# Patient Record
Sex: FEMALE | Race: WHITE | NOT HISPANIC OR LATINO | Employment: FULL TIME | URBAN - METROPOLITAN AREA
[De-identification: names, ages, dates, MRNs, and addresses within clinical notes are randomized per-mention and may not be internally consistent; named-entity substitution may affect disease eponyms.]

---

## 2017-08-17 LAB
ALBUMIN SERPL-MCNC: 4.5 G/DL (ref 3.6–5.1)
ALBUMIN/GLOB SERPL: 1.7 (CALC) (ref 1–2.5)
ALP SERPL-CCNC: 40 U/L (ref 33–130)
ALT SERPL-CCNC: 11 U/L (ref 6–29)
AST SERPL-CCNC: 16 U/L (ref 10–35)
BASOPHILS # BLD AUTO: 20 CELLS/UL (ref 0–200)
BASOPHILS NFR BLD AUTO: 0.6 %
BILIRUB SERPL-MCNC: 0.4 MG/DL (ref 0.2–1.2)
BUN SERPL-MCNC: 19 MG/DL (ref 7–25)
BUN/CREAT SERPL: NORMAL (CALC) (ref 6–22)
CALCIUM SERPL-MCNC: 9.6 MG/DL (ref 8.6–10.4)
CHLORIDE SERPL-SCNC: 103 MMOL/L (ref 98–110)
CHOLEST SERPL-MCNC: 167 MG/DL (ref 125–200)
CHOLEST/HDLC SERPL: 1.7 (CALC)
CO2 SERPL-SCNC: 29 MMOL/L (ref 20–31)
CREAT SERPL-MCNC: 0.84 MG/DL (ref 0.5–1.05)
EOSINOPHIL # BLD AUTO: 50 CELLS/UL (ref 15–500)
EOSINOPHIL NFR BLD AUTO: 1.5 %
ERYTHROCYTE [DISTWIDTH] IN BLOOD BY AUTOMATED COUNT: 13.2 % (ref 11–15)
GLOBULIN SER CALC-MCNC: 2.6 G/DL (CALC) (ref 1.9–3.7)
GLUCOSE SERPL-MCNC: 75 MG/DL (ref 65–99)
HCT VFR BLD AUTO: 38.4 % (ref 35–45)
HDLC SERPL-MCNC: 96 MG/DL
HGB BLD-MCNC: 12.3 G/DL (ref 11.7–15.5)
LDLC SERPL CALC-MCNC: 61 MG/DL (CALC)
LYMPHOCYTES # BLD AUTO: 1026 CELLS/UL (ref 850–3900)
LYMPHOCYTES NFR BLD AUTO: 31.1 %
MCH RBC QN AUTO: 28.5 PG (ref 27–33)
MCHC RBC AUTO-ENTMCNC: 32 G/DL (ref 32–36)
MCV RBC AUTO: 89.1 FL (ref 80–100)
MONOCYTES # BLD AUTO: 314 CELLS/UL (ref 200–950)
MONOCYTES NFR BLD AUTO: 9.5 %
NEUTROPHILS # BLD AUTO: 1891 CELLS/UL (ref 1500–7800)
NEUTROPHILS NFR BLD AUTO: 57.3 %
NONHDLC SERPL-MCNC: 71 MG/DL (CALC)
PLATELET # BLD AUTO: 166 THOUSAND/UL (ref 140–400)
PMV BLD REES-ECKER: 11.7 FL (ref 7.5–12.5)
POTASSIUM SERPL-SCNC: 4.3 MMOL/L (ref 3.5–5.3)
PROT SERPL-MCNC: 7.1 G/DL (ref 6.1–8.1)
RBC # BLD AUTO: 4.31 MILLION/UL (ref 3.8–5.1)
SL AMB EGFR AFRICAN AMERICAN: 92 ML/MIN/1.73M2
SL AMB EGFR NON AFRICAN AMERICAN: 79 ML/MIN/1.73M2
SODIUM SERPL-SCNC: 138 MMOL/L (ref 135–146)
TRIGL SERPL-MCNC: 49 MG/DL
TSH SERPL-ACNC: 3.75 MIU/L
WBC # BLD AUTO: 3.3 THOUSAND/UL (ref 3.8–10.8)

## 2017-08-23 LAB
BASOPHILS # BLD AUTO: 20 CELLS/UL (ref 0–200)
BASOPHILS NFR BLD AUTO: 0.7 %
EOSINOPHIL # BLD AUTO: 41 CELLS/UL (ref 15–500)
EOSINOPHIL NFR BLD AUTO: 1.4 %
ERYTHROCYTE [DISTWIDTH] IN BLOOD BY AUTOMATED COUNT: 13.1 % (ref 11–15)
HCT VFR BLD AUTO: 34.9 % (ref 35–45)
HGB BLD-MCNC: 11.1 G/DL (ref 11.7–15.5)
LYMPHOCYTES # BLD AUTO: 1293 CELLS/UL (ref 850–3900)
LYMPHOCYTES NFR BLD AUTO: 44.6 %
MCH RBC QN AUTO: 28.6 PG (ref 27–33)
MCHC RBC AUTO-ENTMCNC: 31.8 G/DL (ref 32–36)
MCV RBC AUTO: 89.9 FL (ref 80–100)
MONOCYTES # BLD AUTO: 264 CELLS/UL (ref 200–950)
MONOCYTES NFR BLD AUTO: 9.1 %
NEUTROPHILS # BLD AUTO: 1282 CELLS/UL (ref 1500–7800)
NEUTROPHILS NFR BLD AUTO: 44.2 %
PLATELET # BLD AUTO: 155 THOUSAND/UL (ref 140–400)
PMV BLD REES-ECKER: 11.7 FL (ref 7.5–12.5)
RBC # BLD AUTO: 3.88 MILLION/UL (ref 3.8–5.1)
WBC # BLD AUTO: 2.9 THOUSAND/UL (ref 3.8–10.8)

## 2017-09-05 LAB — HCV AB SER-ACNC: NEGATIVE

## 2018-08-15 RX ORDER — FLUTICASONE PROPIONATE 50 MCG
SPRAY, SUSPENSION (ML) NASAL
COMMUNITY
Start: 2017-12-01 | End: 2018-12-06 | Stop reason: SDUPTHER

## 2018-08-15 RX ORDER — ELECTROLYTES/DEXTROSE
SOLUTION, ORAL ORAL EVERY 24 HOURS
COMMUNITY
Start: 2018-05-08

## 2018-08-16 DIAGNOSIS — Z13.220 SCREENING FOR LIPOID DISORDERS: ICD-10-CM

## 2018-08-16 DIAGNOSIS — D72.819 LEUKOPENIA, UNSPECIFIED TYPE: Primary | ICD-10-CM

## 2018-08-16 DIAGNOSIS — Z13.6 SCREENING FOR CARDIOVASCULAR CONDITION: ICD-10-CM

## 2018-08-16 DIAGNOSIS — D52.9 ANEMIA DUE TO FOLIC ACID DEFICIENCY, UNSPECIFIED DEFICIENCY TYPE: ICD-10-CM

## 2018-08-21 ENCOUNTER — APPOINTMENT (OUTPATIENT)
Dept: LAB | Facility: CLINIC | Age: 53
End: 2018-08-21
Payer: COMMERCIAL

## 2018-08-21 DIAGNOSIS — Z13.6 SCREENING FOR CARDIOVASCULAR CONDITION: ICD-10-CM

## 2018-08-21 DIAGNOSIS — D52.9 ANEMIA DUE TO FOLIC ACID DEFICIENCY, UNSPECIFIED DEFICIENCY TYPE: ICD-10-CM

## 2018-08-21 DIAGNOSIS — Z13.220 SCREENING FOR LIPOID DISORDERS: ICD-10-CM

## 2018-08-21 DIAGNOSIS — D72.819 LEUKOPENIA, UNSPECIFIED TYPE: ICD-10-CM

## 2018-08-21 LAB
ALBUMIN SERPL BCP-MCNC: 3.6 G/DL (ref 3.5–5)
ALP SERPL-CCNC: 51 U/L (ref 46–116)
ALT SERPL W P-5'-P-CCNC: 17 U/L (ref 12–78)
ANION GAP SERPL CALCULATED.3IONS-SCNC: 2 MMOL/L (ref 4–13)
AST SERPL W P-5'-P-CCNC: 16 U/L (ref 5–45)
BASOPHILS # BLD AUTO: 0.02 THOUSANDS/ΜL (ref 0–0.1)
BASOPHILS NFR BLD AUTO: 1 % (ref 0–1)
BILIRUB SERPL-MCNC: 0.42 MG/DL (ref 0.2–1)
BUN SERPL-MCNC: 22 MG/DL (ref 5–25)
CALCIUM SERPL-MCNC: 8.9 MG/DL (ref 8.3–10.1)
CHLORIDE SERPL-SCNC: 105 MMOL/L (ref 100–108)
CHOLEST SERPL-MCNC: 151 MG/DL (ref 50–200)
CO2 SERPL-SCNC: 28 MMOL/L (ref 21–32)
CREAT SERPL-MCNC: 0.74 MG/DL (ref 0.6–1.3)
EOSINOPHIL # BLD AUTO: 0.06 THOUSAND/ΜL (ref 0–0.61)
EOSINOPHIL NFR BLD AUTO: 2 % (ref 0–6)
ERYTHROCYTE [DISTWIDTH] IN BLOOD BY AUTOMATED COUNT: 13.2 % (ref 11.6–15.1)
GFR SERPL CREATININE-BSD FRML MDRD: 92 ML/MIN/1.73SQ M
GLUCOSE P FAST SERPL-MCNC: 80 MG/DL (ref 65–99)
HCT VFR BLD AUTO: 37.2 % (ref 34.8–46.1)
HDLC SERPL-MCNC: 88 MG/DL (ref 40–60)
HGB BLD-MCNC: 12.1 G/DL (ref 11.5–15.4)
IMM GRANULOCYTES # BLD AUTO: 0 THOUSAND/UL (ref 0–0.2)
IMM GRANULOCYTES NFR BLD AUTO: 0 % (ref 0–2)
LDLC SERPL CALC-MCNC: 59 MG/DL (ref 0–100)
LYMPHOCYTES # BLD AUTO: 1.48 THOUSANDS/ΜL (ref 0.6–4.47)
LYMPHOCYTES NFR BLD AUTO: 43 % (ref 14–44)
MCH RBC QN AUTO: 29.2 PG (ref 26.8–34.3)
MCHC RBC AUTO-ENTMCNC: 32.5 G/DL (ref 31.4–37.4)
MCV RBC AUTO: 90 FL (ref 82–98)
MONOCYTES # BLD AUTO: 0.3 THOUSAND/ΜL (ref 0.17–1.22)
MONOCYTES NFR BLD AUTO: 9 % (ref 4–12)
NEUTROPHILS # BLD AUTO: 1.6 THOUSANDS/ΜL (ref 1.85–7.62)
NEUTS SEG NFR BLD AUTO: 45 % (ref 43–75)
NONHDLC SERPL-MCNC: 63 MG/DL
NRBC BLD AUTO-RTO: 0 /100 WBCS
PLATELET # BLD AUTO: 155 THOUSANDS/UL (ref 149–390)
PMV BLD AUTO: 11.6 FL (ref 8.9–12.7)
POTASSIUM SERPL-SCNC: 4 MMOL/L (ref 3.5–5.3)
PROT SERPL-MCNC: 7.2 G/DL (ref 6.4–8.2)
RBC # BLD AUTO: 4.15 MILLION/UL (ref 3.81–5.12)
SODIUM SERPL-SCNC: 135 MMOL/L (ref 136–145)
TRIGL SERPL-MCNC: 21 MG/DL
TSH SERPL DL<=0.05 MIU/L-ACNC: 2.35 UIU/ML
WBC # BLD AUTO: 3.46 THOUSAND/UL (ref 4.31–10.16)

## 2018-08-21 PROCEDURE — 85025 COMPLETE CBC W/AUTO DIFF WBC: CPT

## 2018-08-21 PROCEDURE — 80053 COMPREHEN METABOLIC PANEL: CPT

## 2018-08-21 PROCEDURE — 36415 COLL VENOUS BLD VENIPUNCTURE: CPT

## 2018-08-21 PROCEDURE — 84443 ASSAY THYROID STIM HORMONE: CPT

## 2018-08-21 PROCEDURE — 80061 LIPID PANEL: CPT

## 2018-09-21 PROBLEM — N95.1 HOT FLASH, MENOPAUSAL: Status: ACTIVE | Noted: 2017-03-02

## 2018-09-24 ENCOUNTER — OFFICE VISIT (OUTPATIENT)
Dept: FAMILY MEDICINE CLINIC | Facility: CLINIC | Age: 53
End: 2018-09-24
Payer: COMMERCIAL

## 2018-09-24 VITALS
TEMPERATURE: 96.8 F | HEIGHT: 64 IN | BODY MASS INDEX: 18.27 KG/M2 | HEART RATE: 80 BPM | DIASTOLIC BLOOD PRESSURE: 82 MMHG | SYSTOLIC BLOOD PRESSURE: 116 MMHG | RESPIRATION RATE: 16 BRPM | OXYGEN SATURATION: 94 % | WEIGHT: 107 LBS

## 2018-09-24 DIAGNOSIS — Z23 NEED FOR INFLUENZA VACCINATION: ICD-10-CM

## 2018-09-24 DIAGNOSIS — D72.818 OTHER DECREASED WHITE BLOOD CELL (WBC) COUNT: ICD-10-CM

## 2018-09-24 DIAGNOSIS — Z00.00 GENERAL MEDICAL EXAM: Primary | ICD-10-CM

## 2018-09-24 PROBLEM — D72.819 LEUKOPENIA: Status: ACTIVE | Noted: 2018-09-24

## 2018-09-24 PROCEDURE — 90682 RIV4 VACC RECOMBINANT DNA IM: CPT

## 2018-09-24 PROCEDURE — 99396 PREV VISIT EST AGE 40-64: CPT | Performed by: FAMILY MEDICINE

## 2018-09-24 PROCEDURE — 90471 IMMUNIZATION ADMIN: CPT

## 2018-09-24 RX ORDER — CYCLOSPORINE 0.5 MG/ML
EMULSION OPHTHALMIC
COMMUNITY
Start: 2018-07-11

## 2018-09-24 RX ORDER — METRONIDAZOLE 7.5 MG/G
GEL VAGINAL
COMMUNITY
Start: 2018-07-19 | End: 2021-11-03

## 2018-09-24 RX ORDER — BIOTIN 10 MG
TABLET ORAL
COMMUNITY

## 2018-09-24 RX ORDER — NICOTINE 14MG/24HR
PATCH, TRANSDERMAL 24 HOURS TRANSDERMAL
COMMUNITY
Start: 2014-01-23

## 2018-09-24 RX ORDER — VITAMIN B COMPLEX
1 TABLET ORAL
COMMUNITY

## 2018-09-24 NOTE — PATIENT INSTRUCTIONS

## 2018-09-24 NOTE — ASSESSMENT & PLAN NOTE
Asymptomatic afebrile patient already seen by Hematology workup was negative we will watch it every 6 months with CBC with diff the patient was instructed InCase of fever or infection to call the office right away

## 2018-09-24 NOTE — PROGRESS NOTES
[facility] HEALTH MAINTENANCE OFFICE VISIT  [facility] [place] [facility] [place]    NAME: Chery Benitez  AGE: 46 y o  SEX: female  : 1965     DATE: 2018    Assessment and Plan     Problem List Items Addressed This Visit     Leukopenia     Asymptomatic afebrile patient already seen by Hematology workup was negative we will watch it every 6 months with CBC with diff the patient was instructed InCase of fever or infection to call the office right away         Relevant Orders    CBC and differential      Other Visit Diagnoses     General medical exam    -  Primary    Discussed the increased activity healthy diet will hydration sunscreen patient will have the flu shot today    Need for influenza vaccination        Relevant Orders    influenza vaccine, [id number], quadrivalent, recombinant, PF, 0 5 mL, for patients 18 yr+ (FLUBLOK) (Completed)            · Patient Counseling:   · Nutrition: Stressed importance of a well balanced diet, moderation of sodium/saturated fat, caloric balance and sufficient intake of fiber  · Exercise: Stressed the importance of regular exercise with a goal of 150 minutes per week  · Dental Health: Discussed daily flossing and brushing and regular dental visits     · Immunizations reviewed Patient is due for flu shot  · Discussed benefits of screening  Patient is up-to-date with Pap smear and she is up-to-date with mammogram  · Discussed the patient's BMI with her  The BMI is in the acceptable range     Return in about 6 months (around 3/24/2019)          Chief Complaint     Chief Complaint   Patient presents with    Physical Exam     Yearly Physical Exam        History of Present Illness     Patient here for her annual physical exam patient deny any chest pain short of breath no palpitation no headache no blurred vision no weakness or lateralized of the symptom no abdomen pain no nausea vomiting or diarrhea no renal problem no rash no fever no change in the weight   Patient does do exercise regularly 5 times a week she does do healthy diet she is up-to-date with her Pap smear and a mammogram as she did have her colonoscopy at age 47  Recent blood work discussed with the patient show her white blood cell and low leukopenia patient but better from before        Well Adult Physical   Patient here for a comprehensive physical exam       Diet and Physical Activity  Diet: well balanced diet  Weight concerns: None, patient's BMI is between 18 5-24 9  Exercise: frequently      Depression Screen  PHQ-9 Depression Screening    PHQ-9:    Frequency of the following problems over the past two weeks:       Little interest or pleasure in doing things:  0 - not at all  Feeling down, depressed, or hopeless:  0 - not at all  PHQ-2 Score:  0          General Health  Hearing: Normal:  bilateral  Vision: no vision problems  Dental: regular dental visits    Reproductive Health  pt is jai      The following portions of the patient's history were reviewed and updated as appropriate: allergies, current medications, past family history, past medical history, past social history, past surgical history and problem list     Review of Systems     Review of Systems   Constitutional: Negative for fatigue and fever  HENT: Negative for ear pain, sinus pain, sinus pressure and sore throat  Eyes: Negative for pain and redness  Respiratory: Negative for cough, chest tightness and shortness of breath  Cardiovascular: Negative for chest pain, palpitations and leg swelling  Gastrointestinal: Negative for abdominal pain, blood in stool, constipation, diarrhea and nausea  Genitourinary: Negative for flank pain, frequency and hematuria  Musculoskeletal: Negative for back pain and joint swelling  Skin: Negative for rash  Neurological: Negative for dizziness, numbness and headaches  Hematological: Does not bruise/bleed easily     Psychiatric/Behavioral: Negative for behavioral problems, confusion and decreased concentration         Past Medical History     Past Medical History:   Diagnosis Date    Anemia     Anxiety     Arthritis     Depression     Recurrent UTI        Past Surgical History     Past Surgical History:   Procedure Laterality Date    BREAST SURGERY Left     biopsy        Social History     Social History     Social History    Marital status: Single     Spouse name: N/A    Number of children: N/A    Years of education: N/A     Social History Main Topics    Smoking status: Never Smoker    Smokeless tobacco: Never Used      Comment: No passive smoke exposure    Alcohol use No    Drug use: No    Sexual activity: Not Asked     Other Topics Concern    None     Social History Narrative    None       Family History     Family History   Problem Relation Age of Onset    Cancer Father     Colon cancer Maternal Grandfather        Current Medications       Current Outpatient Prescriptions:     B Complex Vitamins (B-COMPLEX/B-12) TABS, Take 1 tablet by mouth, Disp: , Rfl:     Biotin (BIOTIN MAXIMUM STRENGTH) 10 MG TABS, Take by mouth, Disp: , Rfl:     fluticasone (FLONASE) 50 mcg/act nasal spray, inhale 2 spray by intranasal route  every day in each nostril, Disp: , Rfl:     metroNIDAZOLE (METROGEL) 0 75 % vaginal gel, , Disp: , Rfl:     Multiple Vitamins-Minerals (MULTIVITAMIN ADULT) TABS, every 24 hours, Disp: , Rfl:     Olopatadine HCl (PAZEO) 0 7 % SOLN, , Disp: , Rfl:     Omega-3 Fatty Acids (FISH OIL PO), take as directed as needed, Disp: , Rfl:     RESTASIS 0 05 % ophthalmic emulsion, , Disp: , Rfl:     Saccharomyces boulardii (PROBIOTIC) 250 MG CAPS, one daily, Disp: , Rfl:      Allergies     Allergies   Allergen Reactions    Sulfa Antibiotics Rash and Swelling     Other reaction(s): RASH       Objective     /82 (BP Location: Left arm, Patient Position: Sitting, Cuff Size: Adult)   Pulse 80   Temp (!) 96 8 °F (36 °C) (Oral)   Resp 16    5' 3 5" (1 613 m)   Wt 48 5 kg (107 lb)   SpO2 94%   BMI 18 66 kg/m²      Physical Exam   Constitutional: She is oriented to person, place, and time  She appears well-developed and well-nourished  HENT:   Head: Normocephalic  Right Ear: External ear normal    Left Ear: External ear normal    Eyes: Conjunctivae and EOM are normal  Right eye exhibits no discharge  Left eye exhibits no discharge  Neck: No JVD present  Cardiovascular: Normal rate, regular rhythm and normal heart sounds  Exam reveals no gallop  No murmur heard  Pulmonary/Chest: Effort normal  No respiratory distress  She has no wheezes  She has no rales  She exhibits no tenderness  Abdominal: She exhibits no mass  There is no tenderness  There is no rebound  Musculoskeletal: She exhibits no edema or tenderness  Neurological: She is alert and oriented to person, place, and time  No cranial nerve deficit  Coordination normal    Skin: No rash noted  No erythema           No exam data present    Health Maintenance     Health Maintenance   Topic Date Due    CRC Screening: Colonoscopy  1965    INFLUENZA VACCINE  09/01/2018    DTaP,Tdap,and Td Vaccines (2 - Td) 07/13/2026     Immunization History   Administered Date(s) Administered    Influenza 02/11/2015    Influenza TIV (IM) 11/03/2014    Influenza, Quadrivalent (nasal) 12/01/2017    Influenza, recombinant, quadrivalent,injectable, preservative free 09/24/2018    Tdap 07/13/2016    Zoster 10/28/2014       Rachael Fabry, MD  [address] [place] [address]

## 2018-10-22 ENCOUNTER — OFFICE VISIT (OUTPATIENT)
Dept: UROLOGY | Facility: AMBULATORY SURGERY CENTER | Age: 53
End: 2018-10-22
Payer: COMMERCIAL

## 2018-10-22 VITALS
DIASTOLIC BLOOD PRESSURE: 70 MMHG | BODY MASS INDEX: 18.78 KG/M2 | WEIGHT: 110 LBS | SYSTOLIC BLOOD PRESSURE: 118 MMHG | HEIGHT: 64 IN

## 2018-10-22 DIAGNOSIS — N39.0 URINARY TRACT INFECTION WITHOUT HEMATURIA, SITE UNSPECIFIED: Primary | ICD-10-CM

## 2018-10-22 LAB
SL AMB  POCT GLUCOSE, UA: NORMAL
SL AMB LEUKOCYTE ESTERASE,UA: NORMAL
SL AMB POCT BILIRUBIN,UA: NORMAL
SL AMB POCT BLOOD,UA: NORMAL
SL AMB POCT CLARITY,UA: CLEAR
SL AMB POCT COLOR,UA: YELLOW
SL AMB POCT KETONES,UA: NORMAL
SL AMB POCT NITRITE,UA: NORMAL
SL AMB POCT PH,UA: 5
SL AMB POCT SPECIFIC GRAVITY,UA: 1.01
SL AMB POCT URINE PROTEIN: NORMAL
SL AMB POCT UROBILINOGEN: NORMAL

## 2018-10-22 PROCEDURE — 87086 URINE CULTURE/COLONY COUNT: CPT | Performed by: UROLOGY

## 2018-10-22 PROCEDURE — 87147 CULTURE TYPE IMMUNOLOGIC: CPT | Performed by: UROLOGY

## 2018-10-22 PROCEDURE — 99244 OFF/OP CNSLTJ NEW/EST MOD 40: CPT | Performed by: UROLOGY

## 2018-10-22 PROCEDURE — 81002 URINALYSIS NONAUTO W/O SCOPE: CPT | Performed by: UROLOGY

## 2018-10-22 RX ORDER — NITROFURANTOIN MACROCRYSTALS 100 MG/1
100 CAPSULE ORAL AS NEEDED
Qty: 20 CAPSULE | Refills: 3 | Status: SHIPPED | OUTPATIENT
Start: 2018-10-22 | End: 2021-11-03

## 2018-10-22 NOTE — PROGRESS NOTES
10/22/2018    Chery Benitez  1965  [id number]        Assessment  Recurrent urinary tract infections    Plan  We discussed her situation  He it is likely that these infections are related to sexual encounters  He it is common to prescribed Macrodantin as needed  This can be taken within 12 hours after intercourse  Patient is comfortable with this plan  She also understands the importance of adequate hydration  I would like to check a baseline urine culture now as well  Additionally, would check an ultrasound of the urinary tract that she has never had evaluation  She will call us if she develops symptoms further, otherwise follow-up as scheduled  History of Present Illness  SURYA is a 46 y o  female complaining of recurrent urinary tract infections for the last several years  They have become more frequent however and she is quite bothered by this  She develops urgency, dysuria, cloudy urine, and some low back pain  She believes they may be related to sexual intercourse  She reports normal gynecologic evaluations annually  Currently she has no symptoms  She was seen at an urgent care facility where urinalysis was performed  Unfortunately there is no culture available  She was treated with Macrodantin for 7 days in August and again for 7 days in October  Review of Systems  Review of Systems   Constitutional: Negative  HENT: Negative  Respiratory: Negative  Cardiovascular: Negative  Gastrointestinal: Negative  Genitourinary:        As per HPI   Musculoskeletal: Negative  Skin: Negative  Neurological: Negative  Hematological: Negative            Past Medical History  Past Medical History:   Diagnosis Date    Anemia     Anxiety     Arthritis     Depression     Recurrent UTI        Past Social History  Past Surgical History:   Procedure Laterality Date    BREAST SURGERY Left     biopsy        Past Family History  Family History   Problem Relation Age of Onset    Cancer Father     Colon cancer Maternal Grandfather        Past Social history  Social History     Social History    Marital status: Single     Spouse name: N/A    Number of children: N/A    Years of education: N/A     Occupational History    Not on file  Social History Main Topics    Smoking status: Never Smoker    Smokeless tobacco: Never Used      Comment: No passive smoke exposure    Alcohol use No    Drug use: No    Sexual activity: Not on file     Other Topics Concern    Not on file     Social History Narrative    No narrative on file     History   Smoking Status    Never Smoker   Smokeless Tobacco    Never Used     Comment: No passive smoke exposure       Current Medications  Current Outpatient Prescriptions   Medication Sig Dispense Refill    B Complex Vitamins (B-COMPLEX/B-12) TABS Take 1 tablet by mouth      Biotin (BIOTIN MAXIMUM STRENGTH) 10 MG TABS Take by mouth      fluticasone (FLONASE) 50 mcg/act nasal spray inhale 2 spray by intranasal route  every day in each nostril      metroNIDAZOLE (METROGEL) 0 75 % vaginal gel       Multiple Vitamins-Minerals (MULTIVITAMIN ADULT) TABS every 24 hours      Olopatadine HCl (PAZEO) 0 7 % SOLN       Omega-3 Fatty Acids (FISH OIL PO) take as directed as needed      RESTASIS 0 05 % ophthalmic emulsion       Saccharomyces boulardii (PROBIOTIC) 250 MG CAPS one daily      nitrofurantoin (MACRODANTIN) 100 mg capsule Take 1 capsule (100 mg total) by mouth as needed (intercourse) 20 capsule 3     No current facility-administered medications for this visit  Allergies  Allergies   Allergen Reactions    Sulfa Antibiotics Rash and Swelling     Other reaction(s): RASH       Past Medical History, Social History, Family History, medications and allergies were reviewed      Vitals  Vitals:    10/22/18 1344   BP: 118/70   BP Location: Left arm   Patient Position: Sitting   Cuff Size: Adult   Weight: 49 9 kg (110 lb)   Height: 5' 3 5" (1 613 m)       Physical Exam  Physical Exam   Constitutional: She is oriented to person, place, and time  She appears well-developed and well-nourished  Cardiovascular: Normal rate  Pulmonary/Chest: Effort normal    Abdominal: Soft  Genitourinary:   Genitourinary Comments: No CVA tenderness  Musculoskeletal: Normal range of motion  Neurological: She is alert and oriented to person, place, and time  Skin: Skin is warm, dry and intact  Psychiatric: She has a normal mood and affect  Vitals reviewed          Results  No results found for: PSA  Lab Results   Component Value Date    CALCIUM 8 9 08/21/2018     (L) 08/21/2018    K 4 0 08/21/2018    CO2 28 08/21/2018     08/21/2018    BUN 22 08/21/2018    CREATININE 0 74 08/21/2018     Lab Results   Component Value Date    WBC 3 46 (L) 08/21/2018    HGB 12 1 08/21/2018    HCT 37 2 08/21/2018    MCV 90 08/21/2018     08/21/2018

## 2018-10-22 NOTE — LETTER
October 22, 2018     Cuca Julio MD  [address]    Patient: Katelyn Lang   YOB: 1965   Date of Visit: 10/22/2018       Dear Dr Uday Upton: Thank you for referring Katelyn Lang to me for evaluation  Below are my notes for this consultation  If you have questions, please do not hesitate to call me  I look forward to following your patient along with you  Sincerely,        Christ Miramontes MD        CC: No Recipients  Christ Miramontes MD  10/22/2018  4:05 PM  Sign at close encounter  10/22/2018    Katelyn Lang  1965  [id number]        Assessment  Recurrent urinary tract infections    Plan  We discussed her situation  He it is likely that these infections are related to sexual encounters  He it is common to prescribed Macrodantin as needed  This can be taken within 12 hours after intercourse  Patient is comfortable with this plan  She also understands the importance of adequate hydration  I would like to check a baseline urine culture now as well  Additionally, would check an ultrasound of the urinary tract that she has never had evaluation  She will call us if she develops symptoms further, otherwise follow-up as scheduled  History of Present Illness  Fredo Lopez is a 46 y o  female complaining of recurrent urinary tract infections for the last several years  They have become more frequent however and she is quite bothered by this  She develops urgency, dysuria, cloudy urine, and some low back pain  She believes they may be related to sexual intercourse  She reports normal gynecologic evaluations annually  Currently she has no symptoms  She was seen at an urgent care facility where urinalysis was performed  Unfortunately there is no culture available  She was treated with Macrodantin for 7 days in August and again for 7 days in October              Review of Systems  Review of Systems   Constitutional: Negative  HENT: Negative  Respiratory: Negative  Cardiovascular: Negative  Gastrointestinal: Negative  Genitourinary:        As per HPI   Musculoskeletal: Negative  Skin: Negative  Neurological: Negative  Hematological: Negative  Past Medical History  Past Medical History:   Diagnosis Date    Anemia     Anxiety     Arthritis     Depression     Recurrent UTI        Past Social History  Past Surgical History:   Procedure Laterality Date    BREAST SURGERY Left     biopsy        Past Family History  Family History   Problem Relation Age of Onset    Cancer Father     Colon cancer Maternal Grandfather        Past Social history  Social History     Social History    Marital status: Single     Spouse name: N/A    Number of children: N/A    Years of education: N/A     Occupational History    Not on file       Social History Main Topics    Smoking status: Never Smoker    Smokeless tobacco: Never Used      Comment: No passive smoke exposure    Alcohol use No    Drug use: No    Sexual activity: Not on file     Other Topics Concern    Not on file     Social History Narrative    No narrative on file     History   Smoking Status    Never Smoker   Smokeless Tobacco    Never Used     Comment: No passive smoke exposure       Current Medications  Current Outpatient Prescriptions   Medication Sig Dispense Refill    B Complex Vitamins (B-COMPLEX/B-12) TABS Take 1 tablet by mouth      Biotin (BIOTIN MAXIMUM STRENGTH) 10 MG TABS Take by mouth      fluticasone (FLONASE) 50 mcg/act nasal spray inhale 2 spray by intranasal route  every day in each nostril      metroNIDAZOLE (METROGEL) 0 75 % vaginal gel       Multiple Vitamins-Minerals (MULTIVITAMIN ADULT) TABS every 24 hours      Olopatadine HCl (PAZEO) 0 7 % SOLN       Omega-3 Fatty Acids (FISH OIL PO) take as directed as needed      RESTASIS 0 05 % ophthalmic emulsion       Saccharomyces boulardii (PROBIOTIC) 250 MG CAPS one daily      nitrofurantoin (MACRODANTIN) 100 mg capsule Take 1 capsule (100 mg total) by mouth as needed (intercourse) 20 capsule 3     No current facility-administered medications for this visit  Allergies  Allergies   Allergen Reactions    Sulfa Antibiotics Rash and Swelling     Other reaction(s): RASH       Past Medical History, Social History, Family History, medications and allergies were reviewed  Vitals  Vitals:    10/22/18 1344   BP: 118/70   BP Location: Left arm   Patient Position: Sitting   Cuff Size: Adult   Weight: 49 9 kg (110 lb)   Height: 5' 3 5" (1 613 m)       Physical Exam  Physical Exam   Constitutional: She is oriented to person, place, and time  She appears well-developed and well-nourished  Cardiovascular: Normal rate  Pulmonary/Chest: Effort normal    Abdominal: Soft  Genitourinary:   Genitourinary Comments: No CVA tenderness  Musculoskeletal: Normal range of motion  Neurological: She is alert and oriented to person, place, and time  Skin: Skin is warm, dry and intact  Psychiatric: She has a normal mood and affect  Vitals reviewed          Results  No results found for: PSA  Lab Results   Component Value Date    CALCIUM 8 9 08/21/2018     (L) 08/21/2018    K 4 0 08/21/2018    CO2 28 08/21/2018     08/21/2018    BUN 22 08/21/2018    CREATININE 0 74 08/21/2018     Lab Results   Component Value Date    WBC 3 46 (L) 08/21/2018    HGB 12 1 08/21/2018    HCT 37 2 08/21/2018    MCV 90 08/21/2018     08/21/2018

## 2018-10-23 ENCOUNTER — HOSPITAL ENCOUNTER (OUTPATIENT)
Dept: RADIOLOGY | Age: 53
Discharge: HOME | End: 2018-10-23
Payer: COMMERCIAL

## 2018-10-23 DIAGNOSIS — N39.0 URINARY TRACT INFECTION WITHOUT HEMATURIA, SITE UNSPECIFIED: ICD-10-CM

## 2018-10-23 PROCEDURE — 51798 US URINE CAPACITY MEASURE: CPT

## 2018-10-24 ENCOUNTER — TELEPHONE (OUTPATIENT)
Dept: UROLOGY | Facility: AMBULATORY SURGERY CENTER | Age: 53
End: 2018-10-24

## 2018-10-24 LAB
BACTERIA UR CULT: ABNORMAL

## 2018-10-24 NOTE — TELEPHONE ENCOUNTER
Call to patient's home phone and left a detailed message of the instructions given by Dr Fabian Agustin below

## 2018-12-06 DIAGNOSIS — J30.89 ENVIRONMENTAL AND SEASONAL ALLERGIES: Primary | ICD-10-CM

## 2018-12-06 RX ORDER — FLUTICASONE PROPIONATE 50 MCG
SPRAY, SUSPENSION (ML) NASAL
Qty: 16 G | Refills: 2 | Status: SHIPPED | OUTPATIENT
Start: 2018-12-06

## 2021-01-14 ENCOUNTER — IMMUNIZATIONS (OUTPATIENT)
Dept: FAMILY MEDICINE CLINIC | Facility: HOSPITAL | Age: 56
End: 2021-01-14

## 2021-01-14 DIAGNOSIS — Z23 ENCOUNTER FOR IMMUNIZATION: Primary | ICD-10-CM

## 2021-01-14 PROCEDURE — 0011A SARS-COV-2 / COVID-19 MRNA VACCINE (MODERNA) 100 MCG: CPT

## 2021-01-14 PROCEDURE — 91301 SARS-COV-2 / COVID-19 MRNA VACCINE (MODERNA) 100 MCG: CPT

## 2021-02-09 ENCOUNTER — IMMUNIZATIONS (OUTPATIENT)
Dept: FAMILY MEDICINE CLINIC | Facility: HOSPITAL | Age: 56
End: 2021-02-09

## 2021-02-09 DIAGNOSIS — Z23 ENCOUNTER FOR IMMUNIZATION: Primary | ICD-10-CM

## 2021-02-09 PROCEDURE — 91301 SARS-COV-2 / COVID-19 MRNA VACCINE (MODERNA) 100 MCG: CPT

## 2021-02-09 PROCEDURE — 0012A SARS-COV-2 / COVID-19 MRNA VACCINE (MODERNA) 100 MCG: CPT

## 2021-09-29 ENCOUNTER — OFFICE VISIT (OUTPATIENT)
Dept: PODIATRY | Facility: CLINIC | Age: 56
End: 2021-09-29

## 2021-09-29 DIAGNOSIS — M79.673 PAIN OF FOOT, UNSPECIFIED LATERALITY: Primary | ICD-10-CM

## 2021-09-30 NOTE — PROGRESS NOTES
Patient checked in for her visit but unfortunately had to leave  The visit was started but no actual visit   Or medical consultation occurred

## 2021-11-03 ENCOUNTER — APPOINTMENT (OUTPATIENT)
Dept: RADIOLOGY | Facility: HOSPITAL | Age: 56
End: 2021-11-03
Payer: COMMERCIAL

## 2021-11-03 ENCOUNTER — HOSPITAL ENCOUNTER (OUTPATIENT)
Facility: HOSPITAL | Age: 56
Setting detail: OBSERVATION
Discharge: HOME | End: 2021-11-05
Attending: EMERGENCY MEDICINE | Admitting: FAMILY MEDICINE
Payer: COMMERCIAL

## 2021-11-03 DIAGNOSIS — R10.31 INTRACTABLE RIGHT LOWER QUADRANT ABDOMINAL PAIN: Primary | ICD-10-CM

## 2021-11-03 DIAGNOSIS — E86.0 DEHYDRATION: ICD-10-CM

## 2021-11-03 DIAGNOSIS — R11.2 NAUSEA AND VOMITING: ICD-10-CM

## 2021-11-03 DIAGNOSIS — R10.9 ABDOMINAL PAIN: ICD-10-CM

## 2021-11-03 DIAGNOSIS — D72.819 LEUKOPENIA: ICD-10-CM

## 2021-11-03 PROBLEM — E87.1 HYPONATREMIA: Status: ACTIVE | Noted: 2021-11-03

## 2021-11-03 LAB
ALBUMIN SERPL BCP-MCNC: 3.8 G/DL (ref 3.5–5)
ALP SERPL-CCNC: 44 U/L (ref 46–116)
ALT SERPL W P-5'-P-CCNC: 23 U/L (ref 12–78)
ANION GAP SERPL CALCULATED.3IONS-SCNC: 6 MMOL/L (ref 4–13)
AST SERPL W P-5'-P-CCNC: 18 U/L (ref 5–45)
BACTERIA UR QL AUTO: NORMAL /HPF
BASOPHILS # BLD AUTO: 0.03 THOUSANDS/ΜL (ref 0–0.1)
BASOPHILS NFR BLD AUTO: 1 % (ref 0–1)
BILIRUB SERPL-MCNC: 0.5 MG/DL (ref 0.2–1)
BILIRUB UR QL STRIP: NEGATIVE
BUN SERPL-MCNC: 12 MG/DL (ref 5–25)
CALCIUM SERPL-MCNC: 9.3 MG/DL (ref 8.3–10.1)
CHLORIDE SERPL-SCNC: 104 MMOL/L (ref 100–108)
CLARITY UR: CLEAR
CO2 SERPL-SCNC: 25 MMOL/L (ref 21–32)
COLOR UR: YELLOW
CREAT SERPL-MCNC: 0.6 MG/DL (ref 0.6–1.3)
EOSINOPHIL # BLD AUTO: 0.04 THOUSAND/ΜL (ref 0–0.61)
EOSINOPHIL NFR BLD AUTO: 1 % (ref 0–6)
ERYTHROCYTE [DISTWIDTH] IN BLOOD BY AUTOMATED COUNT: 12.4 % (ref 11.6–15.1)
EXT PREG TEST URINE: NEGATIVE
EXT. CONTROL ED NAV: NORMAL
GFR SERPL CREATININE-BSD FRML MDRD: 102 ML/MIN/1.73SQ M
GLUCOSE SERPL-MCNC: 96 MG/DL (ref 65–140)
GLUCOSE UR STRIP-MCNC: NEGATIVE MG/DL
HCT VFR BLD AUTO: 36.7 % (ref 34.8–46.1)
HGB BLD-MCNC: 12.2 G/DL (ref 11.5–15.4)
HGB UR QL STRIP.AUTO: ABNORMAL
HYALINE CASTS #/AREA URNS LPF: NORMAL /LPF
IMM GRANULOCYTES # BLD AUTO: 0.01 THOUSAND/UL (ref 0–0.2)
IMM GRANULOCYTES NFR BLD AUTO: 0 % (ref 0–2)
KETONES UR STRIP-MCNC: ABNORMAL MG/DL
LACTATE SERPL-SCNC: 0.9 MMOL/L (ref 0.5–2)
LEUKOCYTE ESTERASE UR QL STRIP: NEGATIVE
LIPASE SERPL-CCNC: 116 U/L (ref 73–393)
LYMPHOCYTES # BLD AUTO: 1.18 THOUSANDS/ΜL (ref 0.6–4.47)
LYMPHOCYTES NFR BLD AUTO: 24 % (ref 14–44)
MCH RBC QN AUTO: 29.5 PG (ref 26.8–34.3)
MCHC RBC AUTO-ENTMCNC: 33.2 G/DL (ref 31.4–37.4)
MCV RBC AUTO: 89 FL (ref 82–98)
MONOCYTES # BLD AUTO: 0.35 THOUSAND/ΜL (ref 0.17–1.22)
MONOCYTES NFR BLD AUTO: 7 % (ref 4–12)
NEUTROPHILS # BLD AUTO: 3.39 THOUSANDS/ΜL (ref 1.85–7.62)
NEUTS SEG NFR BLD AUTO: 67 % (ref 43–75)
NITRITE UR QL STRIP: NEGATIVE
NON-SQ EPI CELLS URNS QL MICRO: NORMAL /HPF
NRBC BLD AUTO-RTO: 0 /100 WBCS
PH UR STRIP.AUTO: 6.5 [PH] (ref 4.5–8)
PLATELET # BLD AUTO: 182 THOUSANDS/UL (ref 149–390)
PMV BLD AUTO: 10.8 FL (ref 8.9–12.7)
POTASSIUM SERPL-SCNC: 3.7 MMOL/L (ref 3.5–5.3)
PROT SERPL-MCNC: 7.8 G/DL (ref 6.4–8.2)
PROT UR STRIP-MCNC: NEGATIVE MG/DL
RBC # BLD AUTO: 4.13 MILLION/UL (ref 3.81–5.12)
RBC #/AREA URNS AUTO: NORMAL /HPF
SODIUM SERPL-SCNC: 135 MMOL/L (ref 136–145)
SP GR UR STRIP.AUTO: >=1.03 (ref 1–1.03)
UROBILINOGEN UR QL STRIP.AUTO: 0.2 E.U./DL
WBC # BLD AUTO: 5 THOUSAND/UL (ref 4.31–10.16)
WBC #/AREA URNS AUTO: NORMAL /HPF

## 2021-11-03 PROCEDURE — 99220 PR INITIAL OBSERVATION CARE/DAY 70 MINUTES: CPT | Performed by: FAMILY MEDICINE

## 2021-11-03 PROCEDURE — 96374 THER/PROPH/DIAG INJ IV PUSH: CPT

## 2021-11-03 PROCEDURE — 36415 COLL VENOUS BLD VENIPUNCTURE: CPT

## 2021-11-03 PROCEDURE — 85025 COMPLETE CBC W/AUTO DIFF WBC: CPT | Performed by: EMERGENCY MEDICINE

## 2021-11-03 PROCEDURE — 80053 COMPREHEN METABOLIC PANEL: CPT | Performed by: EMERGENCY MEDICINE

## 2021-11-03 PROCEDURE — 99285 EMERGENCY DEPT VISIT HI MDM: CPT | Performed by: EMERGENCY MEDICINE

## 2021-11-03 PROCEDURE — 81025 URINE PREGNANCY TEST: CPT | Performed by: EMERGENCY MEDICINE

## 2021-11-03 PROCEDURE — 96375 TX/PRO/DX INJ NEW DRUG ADDON: CPT

## 2021-11-03 PROCEDURE — 81001 URINALYSIS AUTO W/SCOPE: CPT

## 2021-11-03 PROCEDURE — 73502 X-RAY EXAM HIP UNI 2-3 VIEWS: CPT

## 2021-11-03 PROCEDURE — 99285 EMERGENCY DEPT VISIT HI MDM: CPT

## 2021-11-03 PROCEDURE — 83605 ASSAY OF LACTIC ACID: CPT | Performed by: FAMILY MEDICINE

## 2021-11-03 PROCEDURE — 96361 HYDRATE IV INFUSION ADD-ON: CPT

## 2021-11-03 PROCEDURE — 83690 ASSAY OF LIPASE: CPT | Performed by: EMERGENCY MEDICINE

## 2021-11-03 RX ORDER — ONDANSETRON 2 MG/ML
4 INJECTION INTRAMUSCULAR; INTRAVENOUS ONCE
Status: COMPLETED | OUTPATIENT
Start: 2021-11-03 | End: 2021-11-03

## 2021-11-03 RX ORDER — ONDANSETRON 2 MG/ML
4 INJECTION INTRAMUSCULAR; INTRAVENOUS EVERY 6 HOURS PRN
Status: DISCONTINUED | OUTPATIENT
Start: 2021-11-03 | End: 2021-11-05 | Stop reason: HOSPADM

## 2021-11-03 RX ORDER — ACETAMINOPHEN 325 MG/1
650 TABLET ORAL EVERY 6 HOURS PRN
Status: DISCONTINUED | OUTPATIENT
Start: 2021-11-03 | End: 2021-11-05 | Stop reason: HOSPADM

## 2021-11-03 RX ORDER — DOCUSATE SODIUM 100 MG/1
100 CAPSULE, LIQUID FILLED ORAL 2 TIMES DAILY
Status: DISCONTINUED | OUTPATIENT
Start: 2021-11-03 | End: 2021-11-05 | Stop reason: HOSPADM

## 2021-11-03 RX ORDER — POLYVINYL ALCOHOL 14 MG/ML
1 SOLUTION/ DROPS OPHTHALMIC
Status: DISCONTINUED | OUTPATIENT
Start: 2021-11-03 | End: 2021-11-05 | Stop reason: HOSPADM

## 2021-11-03 RX ORDER — HYDROMORPHONE HCL/PF 1 MG/ML
0.5 SYRINGE (ML) INJECTION
Status: DISCONTINUED | OUTPATIENT
Start: 2021-11-03 | End: 2021-11-05 | Stop reason: HOSPADM

## 2021-11-03 RX ORDER — MELATONIN
1000 DAILY
Status: DISCONTINUED | OUTPATIENT
Start: 2021-11-03 | End: 2021-11-05 | Stop reason: HOSPADM

## 2021-11-03 RX ORDER — MAGNESIUM HYDROXIDE/ALUMINUM HYDROXICE/SIMETHICONE 120; 1200; 1200 MG/30ML; MG/30ML; MG/30ML
30 SUSPENSION ORAL EVERY 6 HOURS PRN
Status: DISCONTINUED | OUTPATIENT
Start: 2021-11-03 | End: 2021-11-05 | Stop reason: HOSPADM

## 2021-11-03 RX ORDER — SODIUM CHLORIDE 9 MG/ML
100 INJECTION, SOLUTION INTRAVENOUS CONTINUOUS
Status: DISCONTINUED | OUTPATIENT
Start: 2021-11-03 | End: 2021-11-05 | Stop reason: HOSPADM

## 2021-11-03 RX ORDER — FENTANYL CITRATE 50 UG/ML
50 INJECTION, SOLUTION INTRAMUSCULAR; INTRAVENOUS ONCE
Status: COMPLETED | OUTPATIENT
Start: 2021-11-03 | End: 2021-11-03

## 2021-11-03 RX ORDER — OXYCODONE HYDROCHLORIDE 5 MG/1
2.5 TABLET ORAL EVERY 4 HOURS PRN
Status: DISCONTINUED | OUTPATIENT
Start: 2021-11-03 | End: 2021-11-05 | Stop reason: HOSPADM

## 2021-11-03 RX ADMIN — SODIUM CHLORIDE 100 ML/HR: 0.9 INJECTION, SOLUTION INTRAVENOUS at 10:38

## 2021-11-03 RX ADMIN — OXYCODONE HYDROCHLORIDE 2.5 MG: 5 TABLET ORAL at 18:52

## 2021-11-03 RX ADMIN — DOCUSATE SODIUM 100 MG: 100 CAPSULE, LIQUID FILLED ORAL at 17:03

## 2021-11-03 RX ADMIN — ONDANSETRON 4 MG: 2 INJECTION INTRAMUSCULAR; INTRAVENOUS at 12:22

## 2021-11-03 RX ADMIN — SODIUM CHLORIDE 1000 ML: 0.9 INJECTION, SOLUTION INTRAVENOUS at 07:29

## 2021-11-03 RX ADMIN — ONDANSETRON 4 MG: 2 INJECTION INTRAMUSCULAR; INTRAVENOUS at 07:28

## 2021-11-03 RX ADMIN — FENTANYL CITRATE 50 MCG: 50 INJECTION INTRAMUSCULAR; INTRAVENOUS at 07:28

## 2021-11-03 RX ADMIN — OXYCODONE HYDROCHLORIDE 2.5 MG: 5 TABLET ORAL at 12:19

## 2021-11-04 ENCOUNTER — APPOINTMENT (OUTPATIENT)
Dept: RADIOLOGY | Facility: HOSPITAL | Age: 56
End: 2021-11-04
Payer: COMMERCIAL

## 2021-11-04 PROBLEM — R50.9 FEVER: Status: ACTIVE | Noted: 2021-11-04

## 2021-11-04 LAB
ALBUMIN SERPL BCP-MCNC: 3.4 G/DL (ref 3.5–5)
ALP SERPL-CCNC: 39 U/L (ref 46–116)
ALT SERPL W P-5'-P-CCNC: 24 U/L (ref 12–78)
ANION GAP SERPL CALCULATED.3IONS-SCNC: 2 MMOL/L (ref 4–13)
AST SERPL W P-5'-P-CCNC: 15 U/L (ref 5–45)
BILIRUB SERPL-MCNC: 0.43 MG/DL (ref 0.2–1)
BUN SERPL-MCNC: 16 MG/DL (ref 5–25)
CALCIUM ALBUM COR SERPL-MCNC: 10.1 MG/DL (ref 8.3–10.1)
CALCIUM SERPL-MCNC: 9.6 MG/DL (ref 8.3–10.1)
CHLORIDE SERPL-SCNC: 106 MMOL/L (ref 100–108)
CO2 SERPL-SCNC: 29 MMOL/L (ref 21–32)
CREAT SERPL-MCNC: 0.66 MG/DL (ref 0.6–1.3)
ERYTHROCYTE [DISTWIDTH] IN BLOOD BY AUTOMATED COUNT: 12.5 % (ref 11.6–15.1)
GFR SERPL CREATININE-BSD FRML MDRD: 98 ML/MIN/1.73SQ M
GLUCOSE SERPL-MCNC: 116 MG/DL (ref 65–140)
HCT VFR BLD AUTO: 36.3 % (ref 34.8–46.1)
HGB BLD-MCNC: 11.8 G/DL (ref 11.5–15.4)
MCH RBC QN AUTO: 29.1 PG (ref 26.8–34.3)
MCHC RBC AUTO-ENTMCNC: 32.5 G/DL (ref 31.4–37.4)
MCV RBC AUTO: 89 FL (ref 82–98)
PLATELET # BLD AUTO: 154 THOUSANDS/UL (ref 149–390)
PMV BLD AUTO: 10.9 FL (ref 8.9–12.7)
POTASSIUM SERPL-SCNC: 3.6 MMOL/L (ref 3.5–5.3)
PROT SERPL-MCNC: 6.8 G/DL (ref 6.4–8.2)
RBC # BLD AUTO: 4.06 MILLION/UL (ref 3.81–5.12)
SODIUM SERPL-SCNC: 137 MMOL/L (ref 136–145)
WBC # BLD AUTO: 4.25 THOUSAND/UL (ref 4.31–10.16)

## 2021-11-04 PROCEDURE — 99244 OFF/OP CNSLTJ NEW/EST MOD 40: CPT | Performed by: SURGERY

## 2021-11-04 PROCEDURE — G1004 CDSM NDSC: HCPCS

## 2021-11-04 PROCEDURE — 76705 ECHO EXAM OF ABDOMEN: CPT

## 2021-11-04 PROCEDURE — 80053 COMPREHEN METABOLIC PANEL: CPT | Performed by: FAMILY MEDICINE

## 2021-11-04 PROCEDURE — 85027 COMPLETE CBC AUTOMATED: CPT | Performed by: FAMILY MEDICINE

## 2021-11-04 PROCEDURE — 99220 PR INITIAL OBSERVATION CARE/DAY 70 MINUTES: CPT | Performed by: FAMILY MEDICINE

## 2021-11-04 PROCEDURE — 74177 CT ABD & PELVIS W/CONTRAST: CPT

## 2021-11-04 RX ADMIN — DOCUSATE SODIUM 100 MG: 100 CAPSULE, LIQUID FILLED ORAL at 08:02

## 2021-11-04 RX ADMIN — ACETAMINOPHEN 650 MG: 325 TABLET, FILM COATED ORAL at 02:52

## 2021-11-04 RX ADMIN — DOCUSATE SODIUM 100 MG: 100 CAPSULE, LIQUID FILLED ORAL at 17:05

## 2021-11-04 RX ADMIN — IOHEXOL 100 ML: 350 INJECTION, SOLUTION INTRAVENOUS at 18:18

## 2021-11-04 RX ADMIN — SODIUM CHLORIDE 100 ML/HR: 0.9 INJECTION, SOLUTION INTRAVENOUS at 11:46

## 2021-11-04 RX ADMIN — ACETAMINOPHEN 650 MG: 325 TABLET, FILM COATED ORAL at 12:14

## 2021-11-04 RX ADMIN — OXYCODONE HYDROCHLORIDE 2.5 MG: 5 TABLET ORAL at 05:39

## 2021-11-04 RX ADMIN — SODIUM CHLORIDE 100 ML/HR: 0.9 INJECTION, SOLUTION INTRAVENOUS at 23:48

## 2021-11-04 RX ADMIN — IOHEXOL 50 ML: 240 INJECTION, SOLUTION INTRATHECAL; INTRAVASCULAR; INTRAVENOUS; ORAL at 14:55

## 2021-11-05 ENCOUNTER — OFFICE VISIT (OUTPATIENT)
Dept: URGENT CARE | Age: 56
End: 2021-11-05
Payer: COMMERCIAL

## 2021-11-05 VITALS
OXYGEN SATURATION: 100 % | DIASTOLIC BLOOD PRESSURE: 85 MMHG | BODY MASS INDEX: 18.79 KG/M2 | TEMPERATURE: 98.2 F | SYSTOLIC BLOOD PRESSURE: 144 MMHG | RESPIRATION RATE: 16 BRPM | WEIGHT: 106.04 LBS | HEIGHT: 63 IN | HEART RATE: 68 BPM

## 2021-11-05 VITALS
HEART RATE: 99 BPM | BODY MASS INDEX: 18.78 KG/M2 | WEIGHT: 106 LBS | TEMPERATURE: 97.5 F | HEIGHT: 63 IN | DIASTOLIC BLOOD PRESSURE: 86 MMHG | RESPIRATION RATE: 20 BRPM | SYSTOLIC BLOOD PRESSURE: 120 MMHG | OXYGEN SATURATION: 98 %

## 2021-11-05 DIAGNOSIS — R21 RASH: Primary | ICD-10-CM

## 2021-11-05 LAB
ALBUMIN SERPL BCP-MCNC: 3.1 G/DL (ref 3.5–5)
ALP SERPL-CCNC: 35 U/L (ref 46–116)
ALT SERPL W P-5'-P-CCNC: 22 U/L (ref 12–78)
ANION GAP SERPL CALCULATED.3IONS-SCNC: 5 MMOL/L (ref 4–13)
AST SERPL W P-5'-P-CCNC: 12 U/L (ref 5–45)
BILIRUB SERPL-MCNC: 0.31 MG/DL (ref 0.2–1)
BUN SERPL-MCNC: 13 MG/DL (ref 5–25)
CALCIUM ALBUM COR SERPL-MCNC: 9.7 MG/DL (ref 8.3–10.1)
CALCIUM SERPL-MCNC: 9 MG/DL (ref 8.3–10.1)
CHLORIDE SERPL-SCNC: 109 MMOL/L (ref 100–108)
CO2 SERPL-SCNC: 27 MMOL/L (ref 21–32)
CREAT SERPL-MCNC: 0.6 MG/DL (ref 0.6–1.3)
ERYTHROCYTE [DISTWIDTH] IN BLOOD BY AUTOMATED COUNT: 12.4 % (ref 11.6–15.1)
GFR SERPL CREATININE-BSD FRML MDRD: 102 ML/MIN/1.73SQ M
GLUCOSE P FAST SERPL-MCNC: 76 MG/DL (ref 65–99)
GLUCOSE SERPL-MCNC: 76 MG/DL (ref 65–140)
HCT VFR BLD AUTO: 33 % (ref 34.8–46.1)
HGB BLD-MCNC: 11 G/DL (ref 11.5–15.4)
MCH RBC QN AUTO: 29.3 PG (ref 26.8–34.3)
MCHC RBC AUTO-ENTMCNC: 33.3 G/DL (ref 31.4–37.4)
MCV RBC AUTO: 88 FL (ref 82–98)
PLATELET # BLD AUTO: 143 THOUSANDS/UL (ref 149–390)
PMV BLD AUTO: 10.9 FL (ref 8.9–12.7)
POTASSIUM SERPL-SCNC: 4.1 MMOL/L (ref 3.5–5.3)
PROT SERPL-MCNC: 6.2 G/DL (ref 6.4–8.2)
RBC # BLD AUTO: 3.75 MILLION/UL (ref 3.81–5.12)
SODIUM SERPL-SCNC: 141 MMOL/L (ref 136–145)
WBC # BLD AUTO: 3.63 THOUSAND/UL (ref 4.31–10.16)

## 2021-11-05 PROCEDURE — 99217 PR OBSERVATION CARE DISCHARGE MANAGEMENT: CPT | Performed by: FAMILY MEDICINE

## 2021-11-05 PROCEDURE — 85027 COMPLETE CBC AUTOMATED: CPT | Performed by: FAMILY MEDICINE

## 2021-11-05 PROCEDURE — 99213 OFFICE O/P EST LOW 20 MIN: CPT | Performed by: STUDENT IN AN ORGANIZED HEALTH CARE EDUCATION/TRAINING PROGRAM

## 2021-11-05 PROCEDURE — 80053 COMPREHEN METABOLIC PANEL: CPT | Performed by: FAMILY MEDICINE

## 2021-11-05 RX ORDER — VALACYCLOVIR HYDROCHLORIDE 1 G/1
1000 TABLET, FILM COATED ORAL 3 TIMES DAILY
Qty: 21 TABLET | Refills: 0 | Status: SHIPPED | OUTPATIENT
Start: 2021-11-05 | End: 2021-11-12

## 2021-11-05 RX ORDER — ACETAMINOPHEN 325 MG/1
650 TABLET ORAL EVERY 6 HOURS PRN
Refills: 0
Start: 2021-11-05

## 2021-11-05 RX ADMIN — SODIUM CHLORIDE 100 ML/HR: 0.9 INJECTION, SOLUTION INTRAVENOUS at 09:32

## 2021-11-05 RX ADMIN — DOCUSATE SODIUM 100 MG: 100 CAPSULE, LIQUID FILLED ORAL at 08:00

## 2021-11-08 ENCOUNTER — APPOINTMENT (OUTPATIENT)
Dept: LAB | Facility: CLINIC | Age: 56
End: 2021-11-08
Payer: COMMERCIAL

## 2021-11-08 DIAGNOSIS — D72.819 LEUKOPENIA: ICD-10-CM

## 2021-11-08 LAB
BASOPHILS # BLD AUTO: 0.02 THOUSANDS/ΜL (ref 0–0.1)
BASOPHILS NFR BLD AUTO: 1 % (ref 0–1)
EOSINOPHIL # BLD AUTO: 0.22 THOUSAND/ΜL (ref 0–0.61)
EOSINOPHIL NFR BLD AUTO: 6 % (ref 0–6)
ERYTHROCYTE [DISTWIDTH] IN BLOOD BY AUTOMATED COUNT: 12.9 % (ref 11.6–15.1)
HCT VFR BLD AUTO: 39.5 % (ref 34.8–46.1)
HGB BLD-MCNC: 13.1 G/DL (ref 11.5–15.4)
IMM GRANULOCYTES # BLD AUTO: 0.01 THOUSAND/UL (ref 0–0.2)
IMM GRANULOCYTES NFR BLD AUTO: 0 % (ref 0–2)
LYMPHOCYTES # BLD AUTO: 1.41 THOUSANDS/ΜL (ref 0.6–4.47)
LYMPHOCYTES NFR BLD AUTO: 37 % (ref 14–44)
MCH RBC QN AUTO: 29.5 PG (ref 26.8–34.3)
MCHC RBC AUTO-ENTMCNC: 33.2 G/DL (ref 31.4–37.4)
MCV RBC AUTO: 89 FL (ref 82–98)
MONOCYTES # BLD AUTO: 0.25 THOUSAND/ΜL (ref 0.17–1.22)
MONOCYTES NFR BLD AUTO: 7 % (ref 4–12)
NEUTROPHILS # BLD AUTO: 1.93 THOUSANDS/ΜL (ref 1.85–7.62)
NEUTS SEG NFR BLD AUTO: 49 % (ref 43–75)
NRBC BLD AUTO-RTO: 0 /100 WBCS
PLATELET # BLD AUTO: 187 THOUSANDS/UL (ref 149–390)
PMV BLD AUTO: 11.4 FL (ref 8.9–12.7)
RBC # BLD AUTO: 4.44 MILLION/UL (ref 3.81–5.12)
WBC # BLD AUTO: 3.84 THOUSAND/UL (ref 4.31–10.16)

## 2021-11-08 PROCEDURE — 85025 COMPLETE CBC W/AUTO DIFF WBC: CPT

## 2021-11-08 PROCEDURE — 36415 COLL VENOUS BLD VENIPUNCTURE: CPT

## 2022-05-03 PROBLEM — G56.23 CUBITAL TUNNEL SYNDROME OF BOTH UPPER EXTREMITIES: Status: ACTIVE | Noted: 2022-05-03

## 2022-05-03 PROBLEM — M65.332 ACQUIRED TRIGGER FINGER OF BOTH MIDDLE FINGERS: Status: ACTIVE | Noted: 2022-05-03

## 2022-05-03 PROBLEM — M65.331 ACQUIRED TRIGGER FINGER OF BOTH MIDDLE FINGERS: Status: ACTIVE | Noted: 2022-05-03

## 2022-05-03 PROBLEM — M06.09 SERONEGATIVE ARTHROPATHY OF MULTIPLE SITES (HCC): Status: ACTIVE | Noted: 2022-05-03

## 2022-05-03 PROBLEM — M65.311 TRIGGER FINGER OF RIGHT THUMB: Status: ACTIVE | Noted: 2022-05-03

## 2022-05-03 PROBLEM — M15.0 PRIMARY GENERALIZED (OSTEO)ARTHRITIS: Status: ACTIVE | Noted: 2022-05-03

## 2022-05-03 PROBLEM — M35.9 UNDIFFERENTIATED CONNECTIVE TISSUE DISEASE (HCC): Status: ACTIVE | Noted: 2022-05-03

## 2022-07-17 PROBLEM — R73.03 PREDIABETES: Status: ACTIVE | Noted: 2022-07-17

## 2022-10-04 PROBLEM — M18.0 PRIMARY OSTEOARTHRITIS OF BOTH FIRST CARPOMETACARPAL JOINTS: Status: ACTIVE | Noted: 2022-10-04

## 2022-10-04 PROBLEM — K58.2 IRRITABLE BOWEL SYNDROME WITH BOTH CONSTIPATION AND DIARRHEA: Status: ACTIVE | Noted: 2022-10-04

## 2022-10-12 PROBLEM — R50.9 FEVER: Status: RESOLVED | Noted: 2021-11-04 | Resolved: 2022-10-12

## 2023-01-03 PROBLEM — S92.902D: Status: ACTIVE | Noted: 2023-01-03

## 2023-02-09 PROBLEM — R76.8 POSITIVE ANA (ANTINUCLEAR ANTIBODY): Status: ACTIVE | Noted: 2022-05-03

## 2023-02-24 ENCOUNTER — HOSPITAL ENCOUNTER (OUTPATIENT)
Dept: RADIOLOGY | Facility: HOSPITAL | Age: 58
Discharge: HOME | End: 2023-02-24

## 2023-02-24 DIAGNOSIS — M06.09 SERONEGATIVE ARTHROPATHY OF MULTIPLE SITES (HCC): ICD-10-CM

## 2023-06-12 ENCOUNTER — APPOINTMENT (OUTPATIENT)
Dept: LAB | Facility: CLINIC | Age: 58
End: 2023-06-12
Payer: COMMERCIAL

## 2023-06-12 DIAGNOSIS — M35.9 UNDIFFERENTIATED CONNECTIVE TISSUE DISEASE (HCC): ICD-10-CM

## 2023-06-12 DIAGNOSIS — Z79.899 ENCOUNTER FOR LONG-TERM (CURRENT) USE OF OTHER MEDICATIONS: ICD-10-CM

## 2023-06-12 DIAGNOSIS — M06.09 SERONEGATIVE ARTHROPATHY OF MULTIPLE SITES (HCC): ICD-10-CM

## 2023-06-12 LAB
ALBUMIN SERPL BCP-MCNC: 4.1 G/DL (ref 3.5–5)
ALP SERPL-CCNC: 49 U/L (ref 46–116)
ALT SERPL W P-5'-P-CCNC: 21 U/L (ref 12–78)
ANION GAP SERPL CALCULATED.3IONS-SCNC: 3 MMOL/L (ref 4–13)
AST SERPL W P-5'-P-CCNC: 17 U/L (ref 5–45)
BASOPHILS # BLD AUTO: 0.03 THOUSANDS/ÂΜL (ref 0–0.1)
BASOPHILS NFR BLD AUTO: 1 % (ref 0–1)
BILIRUB SERPL-MCNC: 0.65 MG/DL (ref 0.2–1)
BUN SERPL-MCNC: 22 MG/DL (ref 5–25)
CALCIUM SERPL-MCNC: 9.4 MG/DL (ref 8.3–10.1)
CHLORIDE SERPL-SCNC: 107 MMOL/L (ref 96–108)
CO2 SERPL-SCNC: 29 MMOL/L (ref 21–32)
CREAT SERPL-MCNC: 0.8 MG/DL (ref 0.6–1.3)
CRP SERPL QL: <3 MG/L
EOSINOPHIL # BLD AUTO: 0.02 THOUSAND/ÂΜL (ref 0–0.61)
EOSINOPHIL NFR BLD AUTO: 1 % (ref 0–6)
ERYTHROCYTE [DISTWIDTH] IN BLOOD BY AUTOMATED COUNT: 13.1 % (ref 11.6–15.1)
ERYTHROCYTE [SEDIMENTATION RATE] IN BLOOD: 4 MM/HOUR (ref 0–29)
GFR SERPL CREATININE-BSD FRML MDRD: 80 ML/MIN/1.73SQ M
GLUCOSE SERPL-MCNC: 86 MG/DL (ref 65–140)
HCT VFR BLD AUTO: 36.8 % (ref 34.8–46.1)
HGB BLD-MCNC: 12.3 G/DL (ref 11.5–15.4)
IMM GRANULOCYTES # BLD AUTO: 0 THOUSAND/UL (ref 0–0.2)
IMM GRANULOCYTES NFR BLD AUTO: 0 % (ref 0–2)
LYMPHOCYTES # BLD AUTO: 1.22 THOUSANDS/ÂΜL (ref 0.6–4.47)
LYMPHOCYTES NFR BLD AUTO: 35 % (ref 14–44)
MCH RBC QN AUTO: 30.5 PG (ref 26.8–34.3)
MCHC RBC AUTO-ENTMCNC: 33.4 G/DL (ref 31.4–37.4)
MCV RBC AUTO: 91 FL (ref 82–98)
MONOCYTES # BLD AUTO: 0.39 THOUSAND/ÂΜL (ref 0.17–1.22)
MONOCYTES NFR BLD AUTO: 11 % (ref 4–12)
NEUTROPHILS # BLD AUTO: 1.86 THOUSANDS/ÂΜL (ref 1.85–7.62)
NEUTS SEG NFR BLD AUTO: 52 % (ref 43–75)
NRBC BLD AUTO-RTO: 0 /100 WBCS
PLATELET # BLD AUTO: 182 THOUSANDS/UL (ref 149–390)
PMV BLD AUTO: 11.9 FL (ref 8.9–12.7)
POTASSIUM SERPL-SCNC: 3.7 MMOL/L (ref 3.5–5.3)
PROT SERPL-MCNC: 7.3 G/DL (ref 6.4–8.4)
RBC # BLD AUTO: 4.03 MILLION/UL (ref 3.81–5.12)
SODIUM SERPL-SCNC: 139 MMOL/L (ref 135–147)
WBC # BLD AUTO: 3.52 THOUSAND/UL (ref 4.31–10.16)

## 2023-06-12 PROCEDURE — 80053 COMPREHEN METABOLIC PANEL: CPT

## 2023-06-12 PROCEDURE — 85652 RBC SED RATE AUTOMATED: CPT

## 2023-06-12 PROCEDURE — 36415 COLL VENOUS BLD VENIPUNCTURE: CPT

## 2023-06-12 PROCEDURE — 86140 C-REACTIVE PROTEIN: CPT

## 2023-06-12 PROCEDURE — 85025 COMPLETE CBC W/AUTO DIFF WBC: CPT

## 2023-06-14 PROBLEM — M35.9 UNDIFFERENTIATED CONNECTIVE TISSUE DISEASE (HCC): Status: ACTIVE | Noted: 2023-06-14

## 2023-12-07 ENCOUNTER — APPOINTMENT (OUTPATIENT)
Dept: LAB | Facility: CLINIC | Age: 58
End: 2023-12-07
Payer: COMMERCIAL

## 2023-12-07 DIAGNOSIS — M06.09 SERONEGATIVE ARTHROPATHY OF MULTIPLE SITES (HCC): ICD-10-CM

## 2023-12-07 DIAGNOSIS — M35.9 UNDIFFERENTIATED CONNECTIVE TISSUE DISEASE (HCC): ICD-10-CM

## 2023-12-07 LAB
ALBUMIN SERPL BCP-MCNC: 4.8 G/DL (ref 3.5–5)
ALP SERPL-CCNC: 42 U/L (ref 34–104)
ALT SERPL W P-5'-P-CCNC: 11 U/L (ref 7–52)
ANA SER QL IA: NEGATIVE
ANION GAP SERPL CALCULATED.3IONS-SCNC: 10 MMOL/L
AST SERPL W P-5'-P-CCNC: 21 U/L (ref 13–39)
BASOPHILS # BLD AUTO: 0.04 THOUSANDS/ÂΜL (ref 0–0.1)
BASOPHILS NFR BLD AUTO: 1 % (ref 0–1)
BILIRUB SERPL-MCNC: 0.37 MG/DL (ref 0.2–1)
BUN SERPL-MCNC: 22 MG/DL (ref 5–25)
CALCIUM SERPL-MCNC: 9.9 MG/DL (ref 8.4–10.2)
CHLORIDE SERPL-SCNC: 101 MMOL/L (ref 96–108)
CO2 SERPL-SCNC: 27 MMOL/L (ref 21–32)
CREAT SERPL-MCNC: 0.8 MG/DL (ref 0.6–1.3)
CRP SERPL QL: <1 MG/L
EOSINOPHIL # BLD AUTO: 0 THOUSAND/ÂΜL (ref 0–0.61)
EOSINOPHIL NFR BLD AUTO: 0 % (ref 0–6)
ERYTHROCYTE [DISTWIDTH] IN BLOOD BY AUTOMATED COUNT: 12.7 % (ref 11.6–15.1)
ERYTHROCYTE [SEDIMENTATION RATE] IN BLOOD: 6 MM/HOUR (ref 0–29)
GFR SERPL CREATININE-BSD FRML MDRD: 80 ML/MIN/1.73SQ M
GLUCOSE SERPL-MCNC: 106 MG/DL (ref 65–140)
HCT VFR BLD AUTO: 40.9 % (ref 34.8–46.1)
HGB BLD-MCNC: 13.9 G/DL (ref 11.5–15.4)
IMM GRANULOCYTES # BLD AUTO: 0.01 THOUSAND/UL (ref 0–0.2)
IMM GRANULOCYTES NFR BLD AUTO: 0 % (ref 0–2)
LYMPHOCYTES # BLD AUTO: 1.62 THOUSANDS/ÂΜL (ref 0.6–4.47)
LYMPHOCYTES NFR BLD AUTO: 28 % (ref 14–44)
MCH RBC QN AUTO: 31 PG (ref 26.8–34.3)
MCHC RBC AUTO-ENTMCNC: 34 G/DL (ref 31.4–37.4)
MCV RBC AUTO: 91 FL (ref 82–98)
MONOCYTES # BLD AUTO: 0.43 THOUSAND/ÂΜL (ref 0.17–1.22)
MONOCYTES NFR BLD AUTO: 8 % (ref 4–12)
NEUTROPHILS # BLD AUTO: 3.62 THOUSANDS/ÂΜL (ref 1.85–7.62)
NEUTS SEG NFR BLD AUTO: 63 % (ref 43–75)
NRBC BLD AUTO-RTO: 0 /100 WBCS
PLATELET # BLD AUTO: 206 THOUSANDS/UL (ref 149–390)
PMV BLD AUTO: 11.8 FL (ref 8.9–12.7)
POTASSIUM SERPL-SCNC: 3.8 MMOL/L (ref 3.5–5.3)
PROT SERPL-MCNC: 7.2 G/DL (ref 6.4–8.4)
RBC # BLD AUTO: 4.48 MILLION/UL (ref 3.81–5.12)
SODIUM SERPL-SCNC: 138 MMOL/L (ref 135–147)
WBC # BLD AUTO: 5.72 THOUSAND/UL (ref 4.31–10.16)

## 2023-12-07 PROCEDURE — 85652 RBC SED RATE AUTOMATED: CPT

## 2023-12-07 PROCEDURE — 36415 COLL VENOUS BLD VENIPUNCTURE: CPT

## 2023-12-07 PROCEDURE — 80053 COMPREHEN METABOLIC PANEL: CPT

## 2023-12-07 PROCEDURE — 86140 C-REACTIVE PROTEIN: CPT

## 2023-12-07 PROCEDURE — 86038 ANTINUCLEAR ANTIBODIES: CPT

## 2023-12-07 PROCEDURE — 85025 COMPLETE CBC W/AUTO DIFF WBC: CPT

## 2024-02-12 PROBLEM — Z79.899 ENCOUNTER FOR LONG-TERM (CURRENT) USE OF OTHER MEDICATIONS: Status: ACTIVE | Noted: 2024-02-12

## 2024-02-20 ENCOUNTER — TELEPHONE (OUTPATIENT)
Age: 59
End: 2024-02-20

## 2024-02-20 NOTE — TELEPHONE ENCOUNTER
----- Message from Jael Fine sent at 2/14/2024  9:25 AM EST -----    ----- Message -----  From: Payton Bunch MD  Sent: 2/12/2024  11:32 PM EST  To: Jael Fine    Notify the patient that I added an additional order for her blood work to her next visit that she should get approximately 2 weeks before with the other labs.  I ordered a Plaquenil avise to check her blood level.  Tell her because she is petite I want to make sure that we optimize her blood level of Plaquenil

## 2024-02-21 PROBLEM — N39.0 URINARY TRACT INFECTION WITHOUT HEMATURIA: Status: RESOLVED | Noted: 2018-10-22 | Resolved: 2024-02-21

## 2024-03-08 ENCOUNTER — TELEPHONE (OUTPATIENT)
Age: 59
End: 2024-03-08

## 2024-03-08 NOTE — TELEPHONE ENCOUNTER
----- Message from Payton Sanchez MD sent at 3/8/2024 12:43 AM EST -----  Plaquenil level is higher than I would like.  Decrease Plaquenil to 1-1/2 tablets every Tuesday, Thursday, Saturday, and Sunday.  Decrease Plaquenil to 1 tablet every Monday, Wednesday, and Friday.  Will recheck Plaquenil level at the time of the next office visit.

## 2024-04-23 ENCOUNTER — TELEPHONE (OUTPATIENT)
Age: 59
End: 2024-04-23

## 2024-05-01 PROBLEM — N39.0 RECURRENT UTI: Status: RESOLVED | Noted: 2018-10-22 | Resolved: 2024-05-01

## 2024-05-29 ENCOUNTER — APPOINTMENT (OUTPATIENT)
Dept: LAB | Facility: CLINIC | Age: 59
End: 2024-05-29
Payer: COMMERCIAL

## 2024-05-29 DIAGNOSIS — Z79.899 ENCOUNTER FOR LONG-TERM (CURRENT) USE OF OTHER MEDICATIONS: ICD-10-CM

## 2024-05-29 DIAGNOSIS — M06.09 SERONEGATIVE ARTHROPATHY OF MULTIPLE SITES (HCC): ICD-10-CM

## 2024-05-29 LAB
ALBUMIN SERPL BCP-MCNC: 4.2 G/DL (ref 3.5–5)
ALP SERPL-CCNC: 34 U/L (ref 34–104)
ALT SERPL W P-5'-P-CCNC: 11 U/L (ref 7–52)
ANION GAP SERPL CALCULATED.3IONS-SCNC: 8 MMOL/L (ref 4–13)
AST SERPL W P-5'-P-CCNC: 17 U/L (ref 13–39)
BASOPHILS # BLD AUTO: 0.01 THOUSANDS/ÂΜL (ref 0–0.1)
BASOPHILS NFR BLD AUTO: 0 % (ref 0–1)
BILIRUB SERPL-MCNC: 0.52 MG/DL (ref 0.2–1)
BUN SERPL-MCNC: 15 MG/DL (ref 5–25)
CALCIUM SERPL-MCNC: 9.1 MG/DL (ref 8.4–10.2)
CHLORIDE SERPL-SCNC: 104 MMOL/L (ref 96–108)
CO2 SERPL-SCNC: 31 MMOL/L (ref 21–32)
CREAT SERPL-MCNC: 0.65 MG/DL (ref 0.6–1.3)
EOSINOPHIL # BLD AUTO: 0.04 THOUSAND/ÂΜL (ref 0–0.61)
EOSINOPHIL NFR BLD AUTO: 1 % (ref 0–6)
ERYTHROCYTE [DISTWIDTH] IN BLOOD BY AUTOMATED COUNT: 12.8 % (ref 11.6–15.1)
ERYTHROCYTE [SEDIMENTATION RATE] IN BLOOD: <1 MM/HOUR (ref 0–29)
GFR SERPL CREATININE-BSD FRML MDRD: 96 ML/MIN/1.73SQ M
GLUCOSE P FAST SERPL-MCNC: 90 MG/DL (ref 65–99)
HCT VFR BLD AUTO: 35.8 % (ref 34.8–46.1)
HGB BLD-MCNC: 11.6 G/DL (ref 11.5–15.4)
IMM GRANULOCYTES # BLD AUTO: 0 THOUSAND/UL (ref 0–0.2)
IMM GRANULOCYTES NFR BLD AUTO: 0 % (ref 0–2)
LYMPHOCYTES # BLD AUTO: 0.98 THOUSANDS/ÂΜL (ref 0.6–4.47)
LYMPHOCYTES NFR BLD AUTO: 33 % (ref 14–44)
MCH RBC QN AUTO: 29.8 PG (ref 26.8–34.3)
MCHC RBC AUTO-ENTMCNC: 32.4 G/DL (ref 31.4–37.4)
MCV RBC AUTO: 92 FL (ref 82–98)
MONOCYTES # BLD AUTO: 0.31 THOUSAND/ÂΜL (ref 0.17–1.22)
MONOCYTES NFR BLD AUTO: 11 % (ref 4–12)
NEUTROPHILS # BLD AUTO: 1.61 THOUSANDS/ÂΜL (ref 1.85–7.62)
NEUTS SEG NFR BLD AUTO: 55 % (ref 43–75)
NRBC BLD AUTO-RTO: 0 /100 WBCS
PLATELET # BLD AUTO: 168 THOUSANDS/UL (ref 149–390)
PMV BLD AUTO: 10.7 FL (ref 8.9–12.7)
POTASSIUM SERPL-SCNC: 4.4 MMOL/L (ref 3.5–5.3)
PROT SERPL-MCNC: 6.3 G/DL (ref 6.4–8.4)
RBC # BLD AUTO: 3.89 MILLION/UL (ref 3.81–5.12)
SODIUM SERPL-SCNC: 143 MMOL/L (ref 135–147)
WBC # BLD AUTO: 2.95 THOUSAND/UL (ref 4.31–10.16)

## 2024-05-29 PROCEDURE — 80053 COMPREHEN METABOLIC PANEL: CPT

## 2024-05-29 PROCEDURE — 36415 COLL VENOUS BLD VENIPUNCTURE: CPT

## 2024-05-29 PROCEDURE — 85025 COMPLETE CBC W/AUTO DIFF WBC: CPT

## 2024-05-29 PROCEDURE — 85652 RBC SED RATE AUTOMATED: CPT

## 2024-05-31 ENCOUNTER — TELEPHONE (OUTPATIENT)
Age: 59
End: 2024-05-31

## 2024-05-31 ENCOUNTER — NURSE TRIAGE (OUTPATIENT)
Age: 59
End: 2024-05-31

## 2024-05-31 DIAGNOSIS — R30.0 DYSURIA: Primary | ICD-10-CM

## 2024-05-31 NOTE — TELEPHONE ENCOUNTER
"  Reason for Disposition  • Urinating more frequently than usual (i.e., frequency)    Answer Assessment - Initial Assessment Questions  1. SYMPTOM: \"What's the main symptom you're concerned about?\" (e.g., frequency, incontinence)      Flank pain, urgency, frequency  2. ONSET: \"When did the  symptoms  start?\"      Unspecified  3. PAIN: \"Is there any pain?\" If Yes, ask: \"How bad is it?\" (Scale: 1-10; mild, moderate, severe)      Denies dysuria  4. CAUSE: \"What do you think is causing the symptoms?\"      UTI vs dehydration  5. OTHER SYMPTOMS: \"Do you have any other symptoms?\" (e.g., fever, flank pain, blood in urine, pain with urination)      Denies    Protocols used: Urinary Symptoms-ADULT-[place]    "

## 2024-05-31 NOTE — TELEPHONE ENCOUNTER
Pt of Dr. Sanchez calling regarding UA results.    She does note increased frequency and urgency with urination as well as mild right flank pain. She denies dysuria, fevers, or blood in the urine. Recommended increased fluids/cranberry juice. Pt aware we will contact her if/when C&S is ordered so she can go to the lab.

## 2024-06-01 ENCOUNTER — APPOINTMENT (OUTPATIENT)
Dept: LAB | Facility: CLINIC | Age: 59
End: 2024-06-01
Payer: COMMERCIAL

## 2024-06-01 DIAGNOSIS — R30.0 DYSURIA: ICD-10-CM

## 2024-06-01 PROCEDURE — 87086 URINE CULTURE/COLONY COUNT: CPT

## 2024-06-02 LAB — BACTERIA UR CULT: NORMAL

## 2024-06-07 ENCOUNTER — TELEPHONE (OUTPATIENT)
Age: 59
End: 2024-06-07

## 2024-06-13 ENCOUNTER — OFFICE VISIT (OUTPATIENT)
Age: 59
End: 2024-06-13

## 2024-06-13 VITALS
SYSTOLIC BLOOD PRESSURE: 114 MMHG | TEMPERATURE: 97.9 F | BODY MASS INDEX: 19.14 KG/M2 | OXYGEN SATURATION: 98 % | DIASTOLIC BLOOD PRESSURE: 62 MMHG | WEIGHT: 104 LBS | HEIGHT: 62 IN | HEART RATE: 96 BPM

## 2024-06-13 DIAGNOSIS — M35.9 UNDIFFERENTIATED CONNECTIVE TISSUE DISEASE (HCC): ICD-10-CM

## 2024-06-13 DIAGNOSIS — M18.0 PRIMARY OSTEOARTHRITIS OF BOTH FIRST CARPOMETACARPAL JOINTS: ICD-10-CM

## 2024-06-13 DIAGNOSIS — K58.2 IRRITABLE BOWEL SYNDROME WITH BOTH CONSTIPATION AND DIARRHEA: ICD-10-CM

## 2024-06-13 DIAGNOSIS — M06.09 SERONEGATIVE ARTHROPATHY OF MULTIPLE SITES (HCC): Primary | ICD-10-CM

## 2024-06-13 DIAGNOSIS — Z79.899 ENCOUNTER FOR LONG-TERM (CURRENT) USE OF OTHER MEDICATIONS: ICD-10-CM

## 2024-06-13 DIAGNOSIS — S92.902D CLOSED MULTIPLE FRACTURES OF LEFT FOOT WITH ROUTINE HEALING, SUBSEQUENT ENCOUNTER: ICD-10-CM

## 2024-06-13 DIAGNOSIS — M15.0 PRIMARY GENERALIZED (OSTEO)ARTHRITIS: ICD-10-CM

## 2024-06-13 DIAGNOSIS — R76.8 POSITIVE ANA (ANTINUCLEAR ANTIBODY): ICD-10-CM

## 2024-06-13 DIAGNOSIS — D72.819 CHRONIC LEUKOPENIA: ICD-10-CM

## 2024-06-13 NOTE — PROGRESS NOTES
Assessment/Plan:    Primary generalized (osteo)arthritis  Primary generalized osteoarthritis hands and feet with no synovitis appreciated.  Rule out inflammatory osteoarthritis.  She did have good success with Plaquenil 400 mg daily, however, since she has decreased the dose after the May Plaquenil level was noted to be high, she feels she has had increased joint symptoms.  I had asked her to taper Plaquenil to 300 mg daily, however, she tapered it to 200 mg daily which she is currently taking as well.  Patient has already had a course of occupational therapy and does work on the home exercise program.  Could consider low-dose prednisone 3 to 4 mg daily if symptoms persist, which has been shown to be of benefit in erosive osteoarthritis.  I also discussed potential for her to be more consistent with the anti-inflammatory diet which may potentially afford her some benefit as well.  Monitor disease activity and medication toxicity with lab work as ordered.  Will repeat Plaquenil level prior to next office visit in 6 months.  I have suggested consideration for functional medicine nutritionist, Dr. Emeli Ellis, who likely will be able to help her with implementation of the diet.    Multiple fractures of left foot with routine healing  Patient does have x-ray evidence of healed metatarsal fracture and midfoot fracture consistent with Lisfranc injury, with slight deformity left midfoot.  She has been able to wear heels when she goes out for dinner.  Have suggested that she go to [facility] for better shoe gear.  I have also suggested a metatarsal bar which may give her symptomatic relief.  Follow-up podiatrist.  Continue to monitor.    Chronic leukopenia  History of chronic leukopenia with white blood cell count lower than it had been.  Absolute neutrophil count 1.61.  No history of recurrent fevers or infections.  Will repeat CBC in 1 month.  Continue to monitor.    Undifferentiated connective tissue disease  (HCC)  History positive LA with lupus Avise 5/10/2022 with low likelihood for lupus with negative index.  All other components negative.  Thyroid antibodies negative.  Repeat LA from lab work dated 12/7/2023 negative.  She does have chronic dry eyes and continues on Restasis.  Chronic leukopenia.  Will monitor for clinical signs and symptoms of connective tissue disease and obtain follow-up serologies if indicated.  Will recheck comprehensive LA profile with next lab work in 6 months.  Continue to monitor.    Seronegative arthropathy of multiple sites (HCC)  Seronegative arthropathy with significant improvement on Plaquenil which she has been on since July 2022, however patient lowered her Plaquenil dosage in March after her Plaquenil advise level was high.  I suggested that she taper to 300 mg daily, however, she dropped her Plaquenil down to 200 mg daily and does note increase in hand-and-foot arthralgias since..  No evidence of synovitis on exam at this visit.  She does have interphalangeal osteoarthritis with intermittent symptoms basal thumb joints and left thumb IP joint.  Musculoskeletal ultrasound from February 2023 significant for no evidence of inflammatory arthritis.  Will continue Plaquenil 200 mg daily per patient's request, however, we will recheck her Plaquenil Avise prior to the next office visit in 6 months, and if lowl, will increase back to 300 mg daily.  Patient to follow-up with eye doctor every 6 to 12 months to monitor for Plaquenil toxicity. Monitor disease activity and medication toxicity with lab work as ordered.  Follow-up 6 months or sooner if needed.    Primary osteoarthritis of both first carpometacarpal joints  First CMC joint osteoarthritis generally stable.  She has had benefit with occupational therapy in the past.  Will consider steroid injection if symptoms warrant.  Would suggest paraffin wax bath for home use for symptomatic relief.  If needed, will refer back to occupational  therapy.  Continue to monitor.    Irritable bowel syndrome with both constipation and diarrhea  Encourage food elimination/anti-inflammatory diet.  Continue probiotics.  Symptoms have improved with dietary changes and probiotics.  Have suggested consultation with functional medicine nutritionist, Dr. Emeli Ellis, who can help the patient be more consistent with the anti-inflammatory diet, which may provide benefit for many of her symptoms.  Continue to monitor.         Problem List Items Addressed This Visit       Chronic leukopenia     History of chronic leukopenia with white blood cell count lower than it had been.  Absolute neutrophil count 1.61.  No history of recurrent fevers or infections.  Will repeat CBC in 1 month.  Continue to monitor.         Relevant Orders    CBC and differential    Primary generalized (osteo)arthritis     Primary generalized osteoarthritis hands and feet with no synovitis appreciated.  Rule out inflammatory osteoarthritis.  She did have good success with Plaquenil 400 mg daily, however, since she has decreased the dose after the May Plaquenil level was noted to be high, she feels she has had increased joint symptoms.  I had asked her to taper Plaquenil to 300 mg daily, however, she tapered it to 200 mg daily which she is currently taking as well.  Patient has already had a course of occupational therapy and does work on the home exercise program.  Could consider low-dose prednisone 3 to 4 mg daily if symptoms persist, which has been shown to be of benefit in erosive osteoarthritis.  I also discussed potential for her to be more consistent with the anti-inflammatory diet which may potentially afford her some benefit as well.  Monitor disease activity and medication toxicity with lab work as ordered.  Will repeat Plaquenil level prior to next office visit in 6 months.  I have suggested consideration for functional medicine nutritionist, Dr. Emeli Ellis, who likely will be able to  help her with implementation of the diet.         Seronegative arthropathy of multiple sites (HCC) - Primary     Seronegative arthropathy with significant improvement on Plaquenil which she has been on since July 2022, however patient lowered her Plaquenil dosage in March after her Plaquenil advise level was high.  I suggested that she taper to 300 mg daily, however, she dropped her Plaquenil down to 200 mg daily and does note increase in hand-and-foot arthralgias since..  No evidence of synovitis on exam at this visit.  She does have interphalangeal osteoarthritis with intermittent symptoms basal thumb joints and left thumb IP joint.  Musculoskeletal ultrasound from February 2023 significant for no evidence of inflammatory arthritis.  Will continue Plaquenil 200 mg daily per patient's request, however, we will recheck her Plaquenil Avise prior to the next office visit in 6 months, and if lowl, will increase back to 300 mg daily.  Patient to follow-up with eye doctor every 6 to 12 months to monitor for Plaquenil toxicity. Monitor disease activity and medication toxicity with lab work as ordered.  Follow-up 6 months or sooner if needed.         Relevant Orders    C-reactive protein    Sedimentation rate, automated    CBC and differential    Comprehensive metabolic panel    Urinalysis with microscopic    MISCELLANEOUS LAB TEST    Positive LA (antinuclear antibody)    Irritable bowel syndrome with both constipation and diarrhea     Encourage food elimination/anti-inflammatory diet.  Continue probiotics.  Symptoms have improved with dietary changes and probiotics.  Have suggested consultation with functional medicine nutritionist, Dr. Emeli Ellis, who can help the patient be more consistent with the anti-inflammatory diet, which may provide benefit for many of her symptoms.  Continue to monitor.         Primary osteoarthritis of both first carpometacarpal joints     First CMC joint osteoarthritis generally stable.   She has had benefit with occupational therapy in the past.  Will consider steroid injection if symptoms warrant.  Would suggest paraffin wax bath for home use for symptomatic relief.  If needed, will refer back to occupational therapy.  Continue to monitor.         Multiple fractures of left foot with routine healing     Patient does have x-ray evidence of healed metatarsal fracture and midfoot fracture consistent with Lisfranc injury, with slight deformity left midfoot.  She has been able to wear heels when she goes out for dinner.  Have suggested that she go to foot [employer] for better shoe gear.  I have also suggested a metatarsal bar which may give her symptomatic relief.  Follow-up podiatrist.  Continue to monitor.         Undifferentiated connective tissue disease (HCC)     History positive LA with lupus Avise 5/10/2022 with low likelihood for lupus with negative index.  All other components negative.  Thyroid antibodies negative.  Repeat LA from lab work dated 12/7/2023 negative.  She does have chronic dry eyes and continues on Restasis.  Chronic leukopenia.  Will monitor for clinical signs and symptoms of connective tissue disease and obtain follow-up serologies if indicated.  Will recheck comprehensive LA profile with next lab work in 6 months.  Continue to monitor.         Relevant Orders    LA Comprehensive Panel    C3 complement    C4 complement    Encounter for long-term (current) use of other medications    Relevant Orders    MISCELLANEOUS LAB TEST           Reviewed records, labs, and imaging with the patient in detail.  Counseled patient.  Discussion regarding my findings and recommendations.  Office visit with documentation 35 min.    Subjective:      Patient ID: Chery Alatorre is a 59 y.o. female.    Patient with seronegative inflammatory arthritis.  She has been on Plaquenil since July 2022 with overall improvement in pain and stiffness in her bilateral hands.  She has prominence left  thumb IP joint consistent with a developing Balbina's node.  She has occasional arthralgias bilateral first CMC joints.  She has had these injected in the past which did provide relief for her.  She is s/p right ring finger trigger release last November.  However she has noticed some increase in hand and foot arthralgias since decreasing the Plaquenil dosage and response to high Plaquenil blood level of 2141.4 on 3/4/2024.  I suggested that she lower the dose to 300 mg daily from 400 mg daily, however, she is actually tapered to 200 mg daily which potentially may be why she has increased for arthralgias.  She notes most of the pain to be at night and first thing in the morning.  She has chronic foot pain left much greater than right likely secondary to history of multiple intra-articular fractures of the Lisfranc articulation in her left foot.  This does interfere with her sleep.  She has had a history of recurrent UTIs and is on probiotics and cranberry.  She has incomplete emptying and urgency have evaluation by urology.  She is currently on low-dose of hormone supplements for hot flashes as prescribed by reproductive endocrinologist.    She has a history of a positive LA.  She did have a lupus Avise test done which revealed low probability for lupus.  She has a history of chronic leukopenia.    She is following with orthopedics for multiple intra-articular fractures of the Lisfranc articulation in her left foot with evidence of healed metatarsal fracture and midfoot fracture on most recent x-ray.  She is no longer wearing a boot and is back in regular shoes.  Right knee arthralgias resolved.    Lab work dated 5/29/2024 significant for urinalysis with 1-2 WBCs and RBCs per high-power field with no history of urinary symptoms.  Calcium 9.1.  Creatinine 0.65 with estimated GFR 96.  Total protein 6.3.  White blood cell count 2.95 with absolute neutrophil count 1.61.  Hemoglobin/hematocrit 11.6/35.8 respectively  with platelet count 168.  Sed rate less than 1.  Vitamin D borderline high dated 1/12/2024 at 107.  Review of lab work dated 12/7/2023 significant for LA negative.  CBC unremarkable.  CRP less than 1.0.  Sed rate 6.  Estimated GFR 88.  Urinalysis 10-20 WBCs per high-power field.  Patient was treated for UTI with Keflex.   Note hemoglobin A1c 6.1 dated 9/27/2022 with insulin level low at 1.2.  Follow-up hemoglobin A1c 6% dated 3/28/2023 with normalized insulin level.    Musculoskeletal ultrasound dated 02/24/2023 significant for no evidence of inflammatory arthritis.  X-ray feet dated 12/18/2023 significant for hallux abductovalgus deformities bilaterally with healed metatarsal fracture base of left foot.        Allergies  Allergies   Allergen Reactions    Sulfa Antibiotics Rash and Swelling     Other reaction(s): RASH       Home Medications    Current Outpatient Medications:     acetaminophen (TYLENOL) 325 mg tablet, Take 2 tablets (650 mg total) by mouth every 6 (six) hours as needed for mild pain, Disp: , Rfl: 0    B Complex Vitamins (B-COMPLEX/B-12) TABS, Take 1 tablet by mouth, Disp: , Rfl:     Biotin 10 MG TABS, Take by mouth, Disp: , Rfl:     Cholecalciferol (VITAMIN D3 PO), Take by mouth, Disp: , Rfl:     CRANBERRY-VITAMIN C PO, Take by mouth, Disp: , Rfl:     GLUCOSAMINE-CHONDROITIN PO, Take by mouth, Disp: , Rfl:     hydroxychloroquine (PLAQUENIL) 200 mg tablet, TAKE 1 TABLET BY MOUTH TWICE A DAY, Disp: 180 tablet, Rfl: 1    MAGNESIUM PO, Take by mouth, Disp: , Rfl:     meloxicam (Mobic) 7.5 mg tablet, Take 1 tablet (7.5 mg total) by mouth daily, Disp: 30 tablet, Rfl: 2    Multiple Vitamins-Minerals (MULTIVITAMIN ADULT) TABS, every 24 hours, Disp: , Rfl:     Omega-3 Fatty Acids (FISH OIL PO), take as directed as needed, Disp: , Rfl:     RESTASIS 0.05 % ophthalmic emulsion, , Disp: , Rfl:     Saccharomyces boulardii (PROBIOTIC) 250 MG CAPS, one daily, Disp: , Rfl:     Turmeric 500 MG TABS, Take by mouth,  Disp: , Rfl:     fluticasone (FLONASE) 50 mcg/act nasal spray, Inhale 2 spray intranasal route daily in each nostril, Disp: 16 g, Rfl: 2    valACYclovir (VALTREX) 1,000 mg tablet, Take 1 tablet (1,000 mg total) by mouth 3 (three) times a day for 7 days, Disp: 21 tablet, Rfl: 0    Past Medical History  Past Medical History:   Diagnosis Date    Anemia     Anxiety     Arthritis     Atypical chest pain     Depression     DUB (dysfunctional uterine bleeding)     GERD (gastroesophageal reflux disease)     History of IBS     Osteoarthritis     Recurrent UTI     Seronegative arthropathy of multiple sites (HCC)     Undifferentiated connective tissue disease (HCC)        Past Surgical History   Past Surgical History:   Procedure Laterality Date    BREAST SURGERY Left     biopsy     COLONOSCOPY  10/2012    2 bxs from hepatic flexure negative . proximal transverse small polyps - consistent with Mucosal bump by Dr Kim    EGD  10/2012    mild, diffuse gastritis. bxs taken negative for spure and hpylori by dr kim    LAPAROSCOPY      OTHER SURGICAL HISTORY      uterine ablation    OTHER SURGICAL HISTORY      tubal ligation       Family History   Family History   Problem Relation Age of Onset    Lymphoma Mother         non Hodgkins    Valvular heart disease Mother     Hypertension Mother     Osteoporosis Mother         fractured ribs    Diabetes Mother         prediabetes    Cancer Father         unknown    Lymphoma Brother     Colon cancer Maternal Grandfather     Osteoarthritis Family        The following portions of the patient's history were reviewed and updated as appropriate: allergies, current medications, past family history, past medical history, past social history, past surgical history, and problem list.    Review of Systems   Constitutional:  Positive for fatigue (mild). Negative for chills and fever.   HENT:  Negative for hearing loss, sore throat and tinnitus.    Eyes:  Negative for pain and visual disturbance.  "       Dry eyes on Restasis.   Respiratory:  Negative for cough and shortness of breath.    Cardiovascular:  Negative for chest pain and palpitations.   Gastrointestinal:  Negative for abdominal pain, nausea and vomiting.   Endocrine:        Vasomotor menopausal sx's.   Genitourinary:  Positive for urgency. Negative for difficulty urinating.        Recurrent UTI  Incomplete emptying   Musculoskeletal:  Positive for arthralgias. Negative for back pain, gait problem, joint swelling, myalgias, neck pain and neck stiffness.   Skin:  Negative for rash.   Neurological:  Positive for numbness. Negative for dizziness, seizures, weakness and headaches.   Psychiatric/Behavioral:  Positive for sleep disturbance (Non refreshed sleep). Negative for confusion and decreased concentration.          Objective:      /62 (BP Location: Right arm, Patient Position: Sitting, Cuff Size: Adult)   Pulse 96   Temp 97.9 °F (36.6 °C) (Temporal)   Ht 5' 2.25\" (1.581 m)   Wt 47.2 kg (104 lb)   SpO2 98%   BMI 18.87 kg/m²          Physical Exam  Vitals reviewed.   Constitutional:       Appearance: Normal appearance.   HENT:      Head: Normocephalic.      Nose:      Comments: Nose and throat unremarkable.  Eyes:      Extraocular Movements: Extraocular movements intact.   Neck:      Comments: Without masses, thyromegaly, lymphadenopathy  Cardiovascular:      Rate and Rhythm: Normal rate and regular rhythm.   Pulmonary:      Breath sounds: Normal breath sounds.   Abdominal:      Palpations: Abdomen is soft.   Musculoskeletal:      Cervical back: Neck supple.      Comments: Neck very mildly decreased lateral flexion with spasm posterior cervical and shoulder muscles.  Shoulders full range of motion.  Elbows full range of motion with positive tinel's medial epicondyles.  Wrists full range of motion.  Tinel's positive bilaterally.  Hands squaring first carpometacarpal joints bilaterally with slight basal thumb tenderness with Heberden's and " Balbina's nodes.  Thickening flexor tendon sheaths bilateral ring and middle fingers, and right thumb. Straight leg raising negative bilaterally.  Schober's negative.  No SI joint tenderness appreciated.  Hips full range of motion.  Knees full range of motion with patellofemoral crepitus.  Ankles full range of motion.  Feet rearfoot hyperpronation with midfoot cavus deformities, high insteps left>right with bony prominence left midfoot, hallux valgus deformities, hammertoe deformities. No synovitis appreciated.    Skin:     General: Skin is warm and dry.   Neurological:      General: No focal deficit present.      Mental Status: She is alert.               This note was written in part using the assistance of the [id number] microphone system. Those portions using this system have been dictated and not read.

## 2024-06-13 NOTE — PATIENT INSTRUCTIONS
Get the repeat CBC in 1 month.  Get the rest of the lab work 2 weeks before your next office visit in 6 months.  Go to [facility] for better shoe gear.  Ask them about metatarsal bars.  If you would like to have an opinion from a functional medicine nutritionist, I would recommend Dr. Emeli Ellis, functional medicine nutritionist.  [phone].

## 2024-06-14 NOTE — ASSESSMENT & PLAN NOTE
History positive LA with lupus Avise 5/10/2022 with low likelihood for lupus with negative index.  All other components negative.  Thyroid antibodies negative.  Repeat LA from lab work dated 12/7/2023 negative.  She does have chronic dry eyes and continues on Restasis.  Chronic leukopenia.  Will monitor for clinical signs and symptoms of connective tissue disease and obtain follow-up serologies if indicated.  Will recheck comprehensive LA profile with next lab work in 6 months.  Continue to monitor.

## 2024-06-14 NOTE — ASSESSMENT & PLAN NOTE
Patient does have x-ray evidence of healed metatarsal fracture and midfoot fracture consistent with Lisfranc injury, with slight deformity left midfoot.  She has been able to wear heels when she goes out for dinner.  Have suggested that she go to [facility] for better shoe gear.  I have also suggested a metatarsal bar which may give her symptomatic relief.  Follow-up podiatrist.  Continue to monitor.

## 2024-06-14 NOTE — ASSESSMENT & PLAN NOTE
Encourage food elimination/anti-inflammatory diet.  Continue probiotics.  Symptoms have improved with dietary changes and probiotics.  Have suggested consultation with functional medicine nutritionist, Dr. Emeli Ellis, who can help the patient be more consistent with the anti-inflammatory diet, which may provide benefit for many of her symptoms.  Continue to monitor.

## 2024-06-14 NOTE — ASSESSMENT & PLAN NOTE
History of chronic leukopenia with white blood cell count lower than it had been.  Absolute neutrophil count 1.61.  No history of recurrent fevers or infections.  Will repeat CBC in 1 month.  Continue to monitor.

## 2024-06-14 NOTE — ASSESSMENT & PLAN NOTE
First CMC joint osteoarthritis generally stable.  She has had benefit with occupational therapy in the past.  Will consider steroid injection if symptoms warrant.  Would suggest paraffin wax bath for home use for symptomatic relief.  If needed, will refer back to occupational therapy.  Continue to monitor.

## 2024-06-14 NOTE — ASSESSMENT & PLAN NOTE
Primary generalized osteoarthritis hands and feet with no synovitis appreciated.  Rule out inflammatory osteoarthritis.  She did have good success with Plaquenil 400 mg daily, however, since she has decreased the dose after the May Plaquenil level was noted to be high, she feels she has had increased joint symptoms.  I had asked her to taper Plaquenil to 300 mg daily, however, she tapered it to 200 mg daily which she is currently taking as well.  Patient has already had a course of occupational therapy and does work on the home exercise program.  Could consider low-dose prednisone 3 to 4 mg daily if symptoms persist, which has been shown to be of benefit in erosive osteoarthritis.  I also discussed potential for her to be more consistent with the anti-inflammatory diet which may potentially afford her some benefit as well.  Monitor disease activity and medication toxicity with lab work as ordered.  Will repeat Plaquenil level prior to next office visit in 6 months.  I have suggested consideration for functional medicine nutritionist, Dr. Emeli Ellis, who likely will be able to help her with implementation of the diet.

## 2024-06-14 NOTE — ASSESSMENT & PLAN NOTE
Seronegative arthropathy with significant improvement on Plaquenil which she has been on since July 2022, however patient lowered her Plaquenil dosage in March after her Plaquenil advise level was high.  I suggested that she taper to 300 mg daily, however, she dropped her Plaquenil down to 200 mg daily and does note increase in hand-and-foot arthralgias since..  No evidence of synovitis on exam at this visit.  She does have interphalangeal osteoarthritis with intermittent symptoms basal thumb joints and left thumb IP joint.  Musculoskeletal ultrasound from February 2023 significant for no evidence of inflammatory arthritis.  Will continue Plaquenil 200 mg daily per patient's request, however, we will recheck her Plaquenil Avise prior to the next office visit in 6 months, and if lowl, will increase back to 300 mg daily.  Patient to follow-up with eye doctor every 6 to 12 months to monitor for Plaquenil toxicity. Monitor disease activity and medication toxicity with lab work as ordered.  Follow-up 6 months or sooner if needed.

## 2024-09-23 ENCOUNTER — TELEPHONE (OUTPATIENT)
Age: 59
End: 2024-09-23

## 2024-09-23 NOTE — TELEPHONE ENCOUNTER
Left message for patient stating that our current test tubes  24.  Will leave the kit at the  for her to  at her convenience.

## 2024-09-23 NOTE — TELEPHONE ENCOUNTER
Patient called in regarding the test kit . Patient stated the last time she was int he test kit in the office the expiration dates were soon to  and was told she will get  call once the new test kit were in . Patient has yet to receive call . Please advise

## 2024-11-19 ENCOUNTER — TELEPHONE (OUTPATIENT)
Age: 59
End: 2024-11-19

## 2024-11-19 NOTE — TELEPHONE ENCOUNTER
Patient called as she has a follow up visit with  on 1/16/25 and was hoping to be seen before the new year as she may no longer have insurance. I advised her at this time  is booked until 6/2025 but I could add her to the wait list. Patient agreed and asked to see if  could do anything else to try and seen her sooner. Please advise if able.

## 2024-11-19 NOTE — TELEPHONE ENCOUNTER
Spoke with patient.  Offered 12-4, she was unable to do.  Will keep her in mind if any other cancellations occur in December.

## 2024-12-10 ENCOUNTER — APPOINTMENT (OUTPATIENT)
Dept: LAB | Facility: CLINIC | Age: 59
End: 2024-12-10
Payer: COMMERCIAL

## 2024-12-10 DIAGNOSIS — Z79.899 ENCOUNTER FOR LONG-TERM (CURRENT) USE OF OTHER MEDICATIONS: ICD-10-CM

## 2024-12-10 DIAGNOSIS — M06.09 SERONEGATIVE ARTHROPATHY OF MULTIPLE SITES (HCC): ICD-10-CM

## 2024-12-10 DIAGNOSIS — D72.819 CHRONIC LEUKOPENIA: ICD-10-CM

## 2024-12-10 DIAGNOSIS — M35.9 UNDIFFERENTIATED CONNECTIVE TISSUE DISEASE (HCC): ICD-10-CM

## 2024-12-10 LAB
ALBUMIN SERPL BCG-MCNC: 4.5 G/DL (ref 3.5–5)
ALP SERPL-CCNC: 39 U/L (ref 34–104)
ALT SERPL W P-5'-P-CCNC: 11 U/L (ref 7–52)
ANA SER QL IA: NEGATIVE
ANION GAP SERPL CALCULATED.3IONS-SCNC: 9 MMOL/L (ref 4–13)
AST SERPL W P-5'-P-CCNC: 18 U/L (ref 13–39)
BASOPHILS # BLD AUTO: 0.04 THOUSANDS/ÂΜL (ref 0–0.1)
BASOPHILS NFR BLD AUTO: 1 % (ref 0–1)
BILIRUB SERPL-MCNC: 0.48 MG/DL (ref 0.2–1)
BUN SERPL-MCNC: 16 MG/DL (ref 5–25)
C4 SERPL-MCNC: 33 MG/DL (ref 19–52)
CALCIUM SERPL-MCNC: 9.9 MG/DL (ref 8.4–10.2)
CHLORIDE SERPL-SCNC: 102 MMOL/L (ref 96–108)
CO2 SERPL-SCNC: 29 MMOL/L (ref 21–32)
CREAT SERPL-MCNC: 0.66 MG/DL (ref 0.6–1.3)
CRP SERPL QL: <1 MG/L
EOSINOPHIL # BLD AUTO: 0.22 THOUSAND/ÂΜL (ref 0–0.61)
EOSINOPHIL NFR BLD AUTO: 6 % (ref 0–6)
ERYTHROCYTE [DISTWIDTH] IN BLOOD BY AUTOMATED COUNT: 12.6 % (ref 11.6–15.1)
ERYTHROCYTE [SEDIMENTATION RATE] IN BLOOD: 8 MM/HOUR (ref 0–29)
GFR SERPL CREATININE-BSD FRML MDRD: 96 ML/MIN/1.73SQ M
GLUCOSE SERPL-MCNC: 79 MG/DL (ref 65–140)
HCT VFR BLD AUTO: 36.4 % (ref 34.8–46.1)
HGB BLD-MCNC: 12 G/DL (ref 11.5–15.4)
IMM GRANULOCYTES # BLD AUTO: 0.02 THOUSAND/UL (ref 0–0.2)
IMM GRANULOCYTES NFR BLD AUTO: 1 % (ref 0–2)
LYMPHOCYTES # BLD AUTO: 1.04 THOUSANDS/ÂΜL (ref 0.6–4.47)
LYMPHOCYTES NFR BLD AUTO: 27 % (ref 14–44)
MCH RBC QN AUTO: 30 PG (ref 26.8–34.3)
MCHC RBC AUTO-ENTMCNC: 33 G/DL (ref 31.4–37.4)
MCV RBC AUTO: 91 FL (ref 82–98)
MONOCYTES # BLD AUTO: 0.35 THOUSAND/ÂΜL (ref 0.17–1.22)
MONOCYTES NFR BLD AUTO: 9 % (ref 4–12)
NEUTROPHILS # BLD AUTO: 2.12 THOUSANDS/ÂΜL (ref 1.85–7.62)
NEUTS SEG NFR BLD AUTO: 56 % (ref 43–75)
NRBC BLD AUTO-RTO: 0 /100 WBCS
PLATELET # BLD AUTO: 231 THOUSANDS/UL (ref 149–390)
PMV BLD AUTO: 11.4 FL (ref 8.9–12.7)
POTASSIUM SERPL-SCNC: 3.9 MMOL/L (ref 3.5–5.3)
PROT SERPL-MCNC: 7.3 G/DL (ref 6.4–8.4)
RBC # BLD AUTO: 4 MILLION/UL (ref 3.81–5.12)
SODIUM SERPL-SCNC: 140 MMOL/L (ref 135–147)
WBC # BLD AUTO: 3.79 THOUSAND/UL (ref 4.31–10.16)

## 2024-12-10 PROCEDURE — 86160 COMPLEMENT ANTIGEN: CPT

## 2024-12-10 PROCEDURE — 80053 COMPREHEN METABOLIC PANEL: CPT

## 2024-12-10 PROCEDURE — 86038 ANTINUCLEAR ANTIBODIES: CPT

## 2024-12-10 PROCEDURE — 36415 COLL VENOUS BLD VENIPUNCTURE: CPT

## 2024-12-10 PROCEDURE — 85652 RBC SED RATE AUTOMATED: CPT

## 2024-12-10 PROCEDURE — 86140 C-REACTIVE PROTEIN: CPT

## 2024-12-10 PROCEDURE — 85025 COMPLETE CBC W/AUTO DIFF WBC: CPT

## 2024-12-11 LAB — C3 SERPL-MCNC: 92 MG/DL (ref 87–200)

## 2024-12-12 ENCOUNTER — APPOINTMENT (OUTPATIENT)
Dept: LAB | Facility: CLINIC | Age: 59
End: 2024-12-12
Payer: COMMERCIAL

## 2024-12-12 DIAGNOSIS — M06.09 RHEUMATOID ARTHRITIS OF MULTIPLE SITES WITHOUT RHEUMATOID FACTOR (HCC): ICD-10-CM

## 2024-12-12 LAB
BACTERIA UR QL AUTO: ABNORMAL /HPF
BILIRUB UR QL STRIP: NEGATIVE
CLARITY UR: CLEAR
COLOR UR: ABNORMAL
GLUCOSE UR STRIP-MCNC: NEGATIVE MG/DL
HGB UR QL STRIP.AUTO: NEGATIVE
KETONES UR STRIP-MCNC: NEGATIVE MG/DL
LEUKOCYTE ESTERASE UR QL STRIP: ABNORMAL
NITRITE UR QL STRIP: NEGATIVE
NON-SQ EPI CELLS URNS QL MICRO: ABNORMAL /HPF
PH UR STRIP.AUTO: 6.5 [PH]
PROT UR STRIP-MCNC: NEGATIVE MG/DL
RBC #/AREA URNS AUTO: ABNORMAL /HPF
SP GR UR STRIP.AUTO: 1.01 (ref 1–1.03)
UROBILINOGEN UR STRIP-ACNC: <2 MG/DL
WBC #/AREA URNS AUTO: ABNORMAL /HPF

## 2024-12-12 PROCEDURE — 81001 URINALYSIS AUTO W/SCOPE: CPT

## 2025-01-06 ENCOUNTER — RESULTS FOLLOW-UP (OUTPATIENT)
Age: 60
End: 2025-01-06

## 2025-01-16 ENCOUNTER — OFFICE VISIT (OUTPATIENT)
Age: 60
End: 2025-01-16
Payer: COMMERCIAL

## 2025-01-16 VITALS
HEART RATE: 75 BPM | DIASTOLIC BLOOD PRESSURE: 78 MMHG | WEIGHT: 101.6 LBS | BODY MASS INDEX: 18.69 KG/M2 | HEIGHT: 62 IN | OXYGEN SATURATION: 97 % | SYSTOLIC BLOOD PRESSURE: 108 MMHG | TEMPERATURE: 96.9 F

## 2025-01-16 DIAGNOSIS — M06.09 SERONEGATIVE ARTHROPATHY OF MULTIPLE SITES (HCC): ICD-10-CM

## 2025-01-16 DIAGNOSIS — M18.0 PRIMARY OSTEOARTHRITIS OF BOTH FIRST CARPOMETACARPAL JOINTS: ICD-10-CM

## 2025-01-16 DIAGNOSIS — D72.819 CHRONIC LEUKOPENIA: ICD-10-CM

## 2025-01-16 DIAGNOSIS — K58.2 IRRITABLE BOWEL SYNDROME WITH BOTH CONSTIPATION AND DIARRHEA: ICD-10-CM

## 2025-01-16 DIAGNOSIS — S92.902D CLOSED MULTIPLE FRACTURES OF LEFT FOOT WITH ROUTINE HEALING, SUBSEQUENT ENCOUNTER: ICD-10-CM

## 2025-01-16 DIAGNOSIS — M15.0 PRIMARY GENERALIZED (OSTEO)ARTHRITIS: ICD-10-CM

## 2025-01-16 DIAGNOSIS — M35.9 UNDIFFERENTIATED CONNECTIVE TISSUE DISEASE (HCC): Primary | ICD-10-CM

## 2025-01-16 PROCEDURE — 99214 OFFICE O/P EST MOD 30 MIN: CPT | Performed by: INTERNAL MEDICINE

## 2025-01-16 RX ORDER — SPIRONOLACTONE 25 MG/1
25 TABLET ORAL 2 TIMES DAILY
COMMUNITY

## 2025-01-16 NOTE — PROGRESS NOTES
Assessment/Plan:    Seronegative arthropathy of multiple sites (HCC)  Seronegative inflammatory arthritis with significant improvement on low-dose Plaquenil 200 mg daily.  Updated Plaquenil level therapeutic at 1146.4.  No evidence of inflammation or synovitis at this office visit.  She does have interphalangeal osteoarthritis with more complaints of first CMC osteoarthritis with night pain as well.  Review of musculoskeletal ultrasound from February 2023 significant for no evidence for inflammatory arthritis.  I have suggested arthritis strength Tylenol as well as a bone spica splint or cool comfort splint for her thumb pain.  I encouraged her to use a paraffin wax bath for symptomatic relief.  If symptoms worsen, she could come in earlier for a steroid injection for CMC joints.  Otherwise plan follow-up in 6 to 8 months with lab work prior to the office visit to monitor disease activity and medication toxicity.    Undifferentiated connective tissue disease (HCC)  History positive LA with lupus Avise 5/10/2022 with low likelihood for lupus with negative index.  All other components negative.  Thyroid antibodies negative.  Repeat LA from lab work dated 12/12/2024 and 12/7/2023 negative.  She does have chronic dry eyes and continues on Restasis.  Chronic leukopenia with no history for recurrent fevers or recurrent infections.  Continue to monitor for clinical signs and symptoms of connective tissue disease as well as lab work as ordered. Continue to monitor.    Primary generalized (osteo)arthritis  Primary generalized osteoarthritis hands and feet with no evidence of inflammatory arthritis on exam in addition to prior musculoskeletal ultrasound from 2/24/2023.  She is currently on Plaquenil 200 mg daily with therapeutic level which has been of great benefit for her seronegative inflammatory arthritis, likely inflammatory osteoarthritis.  At present her main symptom is of bilateral first CMC arthralgias worse at  night.  I have suggested arthritis strength Tylenol up to 2 tablets every 12 hours as needed in addition to a thumb spica splint or cool comfort splint which she can get on [employer].  She would likely benefit with a paraffin wax bath as well.  Continue home exercise program as tolerated.  Follow-up in 6 to 8 months or sooner if needed.  Monitor disease activity and medication toxicity with lab work as ordered.    Primary osteoarthritis of both first carpometacarpal joints  Increasing first CMC arthralgias secondary to osteoarthritis.  She did have benefit with occupational therapy in the past.  Suggest paraffin wax bath for symptomatic relief in addition to ROM spica or cool comfort splint.  If symptoms persist, she could call the office for referral back to occupational therapy or steroid injections if needed.  Continue to monitor.    Multiple fractures of left foot with routine healing  Prior x-ray evidence of healed metatarsal fracture and midfoot fracture consistent with Lisfranc injury, with slight deformity left midfoot.  She is status post right bunion surgery in October 2024 with benefit.  Encourage appropriate shoe gear.  Follow-up with orthopedic surgeon for postop care for her bunionectomy. Continue to monitor.    Chronic leukopenia  Chronic leukopenia with most recent white blood cell count 3.79 with absolute neutrophil count 2.12 stable.  No history of recurrent fevers or infections.  Continue to monitor.    Irritable bowel syndrome with both constipation and diarrhea  Symptoms overall improved with the anti-inflammatory diet.  I have encouraged her to continue with this.  Continue to monitor.         Problem List Items Addressed This Visit     Chronic leukopenia    Chronic leukopenia with most recent white blood cell count 3.79 with absolute neutrophil count 2.12 stable.  No history of recurrent fevers or infections.  Continue to monitor.         Relevant Orders    CBC and differential    Primary  generalized (osteo)arthritis    Primary generalized osteoarthritis hands and feet with no evidence of inflammatory arthritis on exam in addition to prior musculoskeletal ultrasound from 2/24/2023.  She is currently on Plaquenil 200 mg daily with therapeutic level which has been of great benefit for her seronegative inflammatory arthritis, likely inflammatory osteoarthritis.  At present her main symptom is of bilateral first CMC arthralgias worse at night.  I have suggested arthritis strength Tylenol up to 2 tablets every 12 hours as needed in addition to a thumb spica splint or cool comfort splint which she can get on [employer].  She would likely benefit with a paraffin wax bath as well.  Continue home exercise program as tolerated.  Follow-up in 6 to 8 months or sooner if needed.  Monitor disease activity and medication toxicity with lab work as ordered.         Seronegative arthropathy of multiple sites (HCC)    Seronegative inflammatory arthritis with significant improvement on low-dose Plaquenil 200 mg daily.  Updated Plaquenil level therapeutic at 1146.4.  No evidence of inflammation or synovitis at this office visit.  She does have interphalangeal osteoarthritis with more complaints of first CMC osteoarthritis with night pain as well.  Review of musculoskeletal ultrasound from February 2023 significant for no evidence for inflammatory arthritis.  I have suggested arthritis strength Tylenol as well as a bone spica splint or cool comfort splint for her thumb pain.  I encouraged her to use a paraffin wax bath for symptomatic relief.  If symptoms worsen, she could come in earlier for a steroid injection for CMC joints.  Otherwise plan follow-up in 6 to 8 months with lab work prior to the office visit to monitor disease activity and medication toxicity.         Relevant Orders    C-reactive protein    CBC and differential    Comprehensive metabolic panel    dsDNA Antibody by ROMEL, Jace erickson, with Reflex to  Titer    C3 complement    C4 complement    Urinalysis with microscopic    Irritable bowel syndrome with both constipation and diarrhea    Symptoms overall improved with the anti-inflammatory diet.  I have encouraged her to continue with this.  Continue to monitor.         Primary osteoarthritis of both first carpometacarpal joints    Increasing first CMC arthralgias secondary to osteoarthritis.  She did have benefit with occupational therapy in the past.  Suggest paraffin wax bath for symptomatic relief in addition to ROM spica or cool comfort splint.  If symptoms persist, she could call the office for referral back to occupational therapy or steroid injections if needed.  Continue to monitor.         Multiple fractures of left foot with routine healing    Prior x-ray evidence of healed metatarsal fracture and midfoot fracture consistent with Lisfranc injury, with slight deformity left midfoot.  She is status post right bunion surgery in October 2024 with benefit.  Encourage appropriate shoe gear.  Follow-up with orthopedic surgeon for postop care for her bunionectomy. Continue to monitor.         Undifferentiated connective tissue disease (HCC) - Primary    History positive LA with lupus Avise 5/10/2022 with low likelihood for lupus with negative index.  All other components negative.  Thyroid antibodies negative.  Repeat LA from lab work dated 12/12/2024 and 12/7/2023 negative.  She does have chronic dry eyes and continues on Restasis.  Chronic leukopenia with no history for recurrent fevers or recurrent infections.  Continue to monitor for clinical signs and symptoms of connective tissue disease as well as lab work as ordered. Continue to monitor.         Relevant Orders    C-reactive protein    CBC and differential    Comprehensive metabolic panel    dsDNA Antibody by IFA, Jace luctom, with Reflex to Titer    C3 complement    C4 complement    Urinalysis with microscopic             Reviewed records,  labs, and imaging with the patient in detail.  Counseled patient.  Discussion regarding my findings and recommendations.  Office visit with documentation 35 min.    Subjective:      Patient ID: Chery Alatorre is a 60 y.o. female.    Patient with seronegative inflammatory arthritis.  She has been on Plaquenil since July 2022 with overall improvement in pain and stiffness in her bilateral hands.  She has prominence left thumb IP joint consistent with a developing Balbina's node.  She has occasional arthralgias bilateral first CMC joints.  She has had these injected in the past which did provide relief for her.  She is s/p right ring finger trigger release November 2023.  However she has noticed some increase in hand and foot arthralgias since decreasing the Plaquenil dosage in response to high Plaquenil blood level of 2141.4 on 3/4/2024.  I had suggested that she lower the dose to 300 mg daily from 400 mg daily, however, she had actually tapered to 200 mg daily.  At present she is most bothered by her CMC osteoarthritis, particularly at night. She has chronic foot pain left much greater than right with history of multiple intra-articular fractures of the Lisfranc articulation in her left foot.  She is status post right bunion surgery in October 2024.  She has had a history of recurrent UTIs and is on probiotics and cranberry.  She has incomplete emptying and urgency have evaluation by urology.  She is currently on low-dose of hormone supplements for hot flashes as prescribed by reproductive endocrinologist.    Patient does note improvement in irritable bowel syndrome since she has been on the anti-inflammatory diet.  She does note some intermittent diarrhea.  Patient retired last fall after her mother passed away in August.    She has a history of a positive LA.  She did have a lupus Avise test done which revealed low probability for lupus.  She has a history of chronic leukopenia.    She has followed with  orthopedics for multiple intra-articular fractures of the Lisfranc articulation in her left foot with evidence of healed metatarsal fracture and midfoot fracture on most recent x-ray.  She is no longer wearing a boot and is back in regular shoes.  She did have right bunion surgery in October 2024.    Lab work dated 12/12/2024 significant for white blood cell count 3.79 with absolute neutrophil count 2.12.  Sed rate 8.  CRP less than 1.0.  LA negative.  C3 and C4 complements normal.  Calcium 9.9.  Creatinine 0.66 with estimated GFR 96.  Urinalysis benign.  Plaquenil level therapeutic at 1146.4 on lower dose Plaquenil 200 mg daily.  Review of lab work dated 5/29/2024 significant for urinalysis with 1-2 WBCs and RBCs per high-power field with no history of urinary symptoms.  Calcium 9.1.  Creatinine 0.65 with estimated GFR 96.  Total protein 6.3.  White blood cell count 2.95 with absolute neutrophil count 1.61.  Hemoglobin/hematocrit 11.6/35.8 respectively with platelet count 168.  Sed rate less than 1.  Vitamin D borderline high dated 1/12/2024 at 107.  Review of lab work dated 12/7/2023 significant for LA negative.  CBC unremarkable.  CRP less than 1.0.  Sed rate 6.  Estimated GFR 88.  Urinalysis 10-20 WBCs per high-power field.  Patient was treated for UTI with Keflex.   Note hemoglobin A1c 6.1 dated 9/27/2022 with insulin level low at 1.2.  Follow-up hemoglobin A1c 6% dated 3/28/2023 with normalized insulin level.    Musculoskeletal ultrasound dated 02/24/2023 significant for no evidence of inflammatory arthritis.  X-ray feet dated 12/18/2023 significant for hallux abductovalgus deformities bilaterally with healed metatarsal fracture base of left foot.        Allergies  Allergies   Allergen Reactions   • Sulfa Antibiotics Rash and Swelling     Other reaction(s): RASH       Home Medications    Current Outpatient Medications:   •  acetaminophen (TYLENOL) 325 mg tablet, Take 2 tablets (650 mg total) by mouth every 6  (six) hours as needed for mild pain, Disp: , Rfl: 0  •  B Complex Vitamins (B-COMPLEX/B-12) TABS, Take 1 tablet by mouth, Disp: , Rfl:   •  Biotin 10 MG TABS, Take by mouth, Disp: , Rfl:   •  Cholecalciferol (VITAMIN D3 PO), Take by mouth, Disp: , Rfl:   •  CRANBERRY-VITAMIN C PO, Take by mouth, Disp: , Rfl:   •  GLUCOSAMINE-CHONDROITIN PO, Take by mouth, Disp: , Rfl:   •  hydroxychloroquine (PLAQUENIL) 200 mg tablet, Take 1-1/2 tablets daily., Disp: 135 tablet, Rfl: 1  •  MAGNESIUM PO, Take by mouth, Disp: , Rfl:   •  Multiple Vitamins-Minerals (MULTIVITAMIN ADULT) TABS, every 24 hours, Disp: , Rfl:   •  Omega-3 Fatty Acids (FISH OIL PO), take as directed as needed, Disp: , Rfl:   •  RESTASIS 0.05 % ophthalmic emulsion, , Disp: , Rfl:   •  spironolactone (ALDACTONE) 25 mg tablet, Take 25 mg by mouth 2 (two) times a day, Disp: , Rfl:   •  fluticasone (FLONASE) 50 mcg/act nasal spray, Inhale 2 spray intranasal route daily in each nostril, Disp: 16 g, Rfl: 2  •  meloxicam (Mobic) 7.5 mg tablet, Take 1 tablet (7.5 mg total) by mouth daily, Disp: 30 tablet, Rfl: 2  •  Saccharomyces boulardii (PROBIOTIC) 250 MG CAPS, one daily, Disp: , Rfl:   •  Turmeric 500 MG TABS, Take by mouth (Patient not taking: Reported on 1/16/2025), Disp: , Rfl:   •  valACYclovir (VALTREX) 1,000 mg tablet, Take 1 tablet (1,000 mg total) by mouth 3 (three) times a day for 7 days, Disp: 21 tablet, Rfl: 0    Past Medical History  Past Medical History:   Diagnosis Date   • Anemia    • Anxiety    • Arthritis    • Atypical chest pain    • Depression    • DUB (dysfunctional uterine bleeding)    • GERD (gastroesophageal reflux disease)    • History of IBS    • Osteoarthritis    • Recurrent UTI    • Seronegative arthropathy of multiple sites (HCC)    • Undifferentiated connective tissue disease (HCC)        Past Surgical History   Past Surgical History:   Procedure Laterality Date   • BREAST SURGERY Left     biopsy    • BUNIONECTOMY Right 10/17/2024   •  COLONOSCOPY  10/2012    2 bxs from hepatic flexure negative . proximal transverse small polyps - consistent with Mucosal bump by Dr Kim   • EGD  10/2012    mild, diffuse gastritis. bxs taken negative for spure and hpylori by dr kim   • LAPAROSCOPY     • OTHER SURGICAL HISTORY      uterine ablation   • OTHER SURGICAL HISTORY      tubal ligation       Family History   Family History   Problem Relation Age of Onset   • Lymphoma Mother         non Hodgkins   • Valvular heart disease Mother    • Hypertension Mother    • Osteoporosis Mother         fractured ribs   • Diabetes Mother         prediabetes   • Cancer Father         unknown   • Lymphoma Brother    • Colon cancer Maternal Grandfather    • Osteoarthritis Family        The following portions of the patient's history were reviewed and updated as appropriate: allergies, current medications, past family history, past medical history, past social history, past surgical history, and problem list.    Review of Systems   Constitutional:  Positive for fatigue (mild). Negative for chills and fever.   HENT:  Negative for hearing loss, sore throat and tinnitus.    Eyes:  Negative for pain and visual disturbance.        Dry eyes on Restasis.   Respiratory:  Negative for cough and shortness of breath.    Cardiovascular:  Negative for chest pain and palpitations.   Gastrointestinal:  Negative for abdominal pain, nausea and vomiting.        IBS with benefit with anti-inflam diet. Still some intermittent diarrhea.   Endocrine:        Vasomotor menopausal sx's.   Genitourinary:  Positive for urgency. Negative for difficulty urinating.        Recurrent UTI  Incomplete emptying   Musculoskeletal:  Positive for arthralgias. Negative for back pain, gait problem, joint swelling, myalgias, neck pain and neck stiffness.   Skin:  Negative for rash.   Neurological:  Positive for numbness. Negative for dizziness, seizures, weakness and headaches.   Psychiatric/Behavioral:  Positive  "for sleep disturbance (Non refreshed sleep). Negative for confusion and decreased concentration.          Objective:      /78   Pulse 75   Temp (!) 96.9 °F (36.1 °C) (Tympanic)   Ht 5' 2\" (1.575 m)   Wt 46.1 kg (101 lb 9.6 oz)   SpO2 97%   BMI 18.58 kg/m²          Physical Exam  Vitals reviewed.   Constitutional:       Appearance: Normal appearance.   HENT:      Head: Normocephalic.      Nose:      Comments: Nose and throat unremarkable.  Eyes:      Extraocular Movements: Extraocular movements intact.   Neck:      Comments: Without masses, thyromegaly, lymphadenopathy  Cardiovascular:      Rate and Rhythm: Normal rate and regular rhythm.   Pulmonary:      Breath sounds: Normal breath sounds.   Abdominal:      Palpations: Abdomen is soft.   Musculoskeletal:      Cervical back: Neck supple.      Comments: Neck very mildly decreased lateral flexion with spasm posterior cervical and shoulder muscles.  Shoulders full range of motion.  Elbows full range of motion with positive tinel's medial epicondyles.  Wrists full range of motion.  Tinel's positive bilaterally.  Hands squaring first carpometacarpal joints bilaterally with slight basal thumb tenderness with Heberden's and Balbina's nodes.  Thickening flexor tendon sheaths bilateral ring and middle fingers, and right thumb. Straight leg raising negative bilaterally.  Schober's negative.  No SI joint tenderness appreciated.  Hips full range of motion.  Knees full range of motion with patellofemoral crepitus.  Ankles full range of motion.  Feet rearfoot hyperpronation with midfoot cavus deformities, high insteps left>right with bony prominence left midfoot, hallux valgus deformity left, hammertoe deformities. No synovitis appreciated.    Skin:     General: Skin is warm and dry.   Neurological:      General: No focal deficit present.      Mental Status: She is alert.               This note was written in part using the assistance of the [employer] " Fluency Direct [id number] microphone system. Those portions using this system have been dictated and not read.

## 2025-02-03 NOTE — ASSESSMENT & PLAN NOTE
Primary generalized osteoarthritis hands and feet with no evidence of inflammatory arthritis on exam in addition to prior musculoskeletal ultrasound from 2/24/2023.  She is currently on Plaquenil 200 mg daily with therapeutic level which has been of great benefit for her seronegative inflammatory arthritis, likely inflammatory osteoarthritis.  At present her main symptom is of bilateral first CMC arthralgias worse at night.  I have suggested arthritis strength Tylenol up to 2 tablets every 12 hours as needed in addition to a thumb spica splint or cool comfort splint which she can get on [employer].  She would likely benefit with a paraffin wax bath as well.  Continue home exercise program as tolerated.  Follow-up in 6 to 8 months or sooner if needed.  Monitor disease activity and medication toxicity with lab work as ordered.

## 2025-02-03 NOTE — ASSESSMENT & PLAN NOTE
History positive LA with lupus Avise 5/10/2022 with low likelihood for lupus with negative index.  All other components negative.  Thyroid antibodies negative.  Repeat LA from lab work dated 12/12/2024 and 12/7/2023 negative.  She does have chronic dry eyes and continues on Restasis.  Chronic leukopenia with no history for recurrent fevers or recurrent infections.  Continue to monitor for clinical signs and symptoms of connective tissue disease as well as lab work as ordered. Continue to monitor.

## 2025-02-03 NOTE — ASSESSMENT & PLAN NOTE
Chronic leukopenia with most recent white blood cell count 3.79 with absolute neutrophil count 2.12 stable.  No history of recurrent fevers or infections.  Continue to monitor.

## 2025-02-03 NOTE — ASSESSMENT & PLAN NOTE
Seronegative inflammatory arthritis with significant improvement on low-dose Plaquenil 200 mg daily.  Updated Plaquenil level therapeutic at 1146.4.  No evidence of inflammation or synovitis at this office visit.  She does have interphalangeal osteoarthritis with more complaints of first CMC osteoarthritis with night pain as well.  Review of musculoskeletal ultrasound from February 2023 significant for no evidence for inflammatory arthritis.  I have suggested arthritis strength Tylenol as well as a bone spica splint or cool comfort splint for her thumb pain.  I encouraged her to use a paraffin wax bath for symptomatic relief.  If symptoms worsen, she could come in earlier for a steroid injection for CMC joints.  Otherwise plan follow-up in 6 to 8 months with lab work prior to the office visit to monitor disease activity and medication toxicity.

## 2025-02-03 NOTE — ASSESSMENT & PLAN NOTE
Increasing first CMC arthralgias secondary to osteoarthritis.  She did have benefit with occupational therapy in the past.  Suggest paraffin wax bath for symptomatic relief in addition to ROM spica or cool comfort splint.  If symptoms persist, she could call the office for referral back to occupational therapy or steroid injections if needed.  Continue to monitor.

## 2025-02-03 NOTE — ASSESSMENT & PLAN NOTE
Symptoms overall improved with the anti-inflammatory diet.  I have encouraged her to continue with this.  Continue to monitor.

## 2025-02-03 NOTE — ASSESSMENT & PLAN NOTE
Prior x-ray evidence of healed metatarsal fracture and midfoot fracture consistent with Lisfranc injury, with slight deformity left midfoot.  She is status post right bunion surgery in October 2024 with benefit.  Encourage appropriate shoe gear.  Follow-up with orthopedic surgeon for postop care for her bunionectomy. Continue to monitor.